# Patient Record
Sex: MALE | Race: WHITE | NOT HISPANIC OR LATINO | Employment: OTHER | URBAN - METROPOLITAN AREA
[De-identification: names, ages, dates, MRNs, and addresses within clinical notes are randomized per-mention and may not be internally consistent; named-entity substitution may affect disease eponyms.]

---

## 2023-11-01 ENCOUNTER — HOSPITAL ENCOUNTER (EMERGENCY)
Facility: MEDICAL CENTER | Age: 68
End: 2023-11-01
Attending: STUDENT IN AN ORGANIZED HEALTH CARE EDUCATION/TRAINING PROGRAM
Payer: MEDICARE

## 2023-11-01 ENCOUNTER — APPOINTMENT (OUTPATIENT)
Dept: RADIOLOGY | Facility: MEDICAL CENTER | Age: 68
End: 2023-11-01
Attending: STUDENT IN AN ORGANIZED HEALTH CARE EDUCATION/TRAINING PROGRAM
Payer: MEDICARE

## 2023-11-01 VITALS
BODY MASS INDEX: 28.69 KG/M2 | SYSTOLIC BLOOD PRESSURE: 141 MMHG | RESPIRATION RATE: 16 BRPM | DIASTOLIC BLOOD PRESSURE: 81 MMHG | HEART RATE: 89 BPM | OXYGEN SATURATION: 92 % | TEMPERATURE: 98.8 F | WEIGHT: 200.4 LBS | HEIGHT: 70 IN

## 2023-11-01 DIAGNOSIS — R55 SYNCOPE, UNSPECIFIED SYNCOPE TYPE: ICD-10-CM

## 2023-11-01 DIAGNOSIS — F10.920 ACUTE ALCOHOLIC INTOXICATION WITHOUT COMPLICATION (HCC): ICD-10-CM

## 2023-11-01 DIAGNOSIS — S02.2XXA CLOSED FRACTURE OF NASAL BONE, INITIAL ENCOUNTER: ICD-10-CM

## 2023-11-01 DIAGNOSIS — S01.81XA FACIAL LACERATION, INITIAL ENCOUNTER: ICD-10-CM

## 2023-11-01 DIAGNOSIS — T07.XXXA ABRASIONS OF MULTIPLE SITES: ICD-10-CM

## 2023-11-01 LAB
ALBUMIN SERPL BCP-MCNC: 4.8 G/DL (ref 3.2–4.9)
ALBUMIN/GLOB SERPL: 1.5 G/DL
ALP SERPL-CCNC: 64 U/L (ref 30–99)
ALT SERPL-CCNC: 33 U/L (ref 2–50)
ANION GAP SERPL CALC-SCNC: 14 MMOL/L (ref 7–16)
AST SERPL-CCNC: 34 U/L (ref 12–45)
BASOPHILS # BLD AUTO: 0.8 % (ref 0–1.8)
BASOPHILS # BLD: 0.13 K/UL (ref 0–0.12)
BILIRUB SERPL-MCNC: 0.2 MG/DL (ref 0.1–1.5)
BUN SERPL-MCNC: 9 MG/DL (ref 8–22)
CALCIUM ALBUM COR SERPL-MCNC: 8.4 MG/DL (ref 8.5–10.5)
CALCIUM SERPL-MCNC: 9 MG/DL (ref 8.5–10.5)
CHLORIDE SERPL-SCNC: 100 MMOL/L (ref 96–112)
CO2 SERPL-SCNC: 23 MMOL/L (ref 20–33)
CREAT SERPL-MCNC: 0.81 MG/DL (ref 0.5–1.4)
EKG IMPRESSION: NORMAL
EOSINOPHIL # BLD AUTO: 0.67 K/UL (ref 0–0.51)
EOSINOPHIL NFR BLD: 4 % (ref 0–6.9)
ERYTHROCYTE [DISTWIDTH] IN BLOOD BY AUTOMATED COUNT: 44.4 FL (ref 35.9–50)
ETHANOL BLD-MCNC: 219 MG/DL
GFR SERPLBLD CREATININE-BSD FMLA CKD-EPI: 96 ML/MIN/1.73 M 2
GLOBULIN SER CALC-MCNC: 3.2 G/DL (ref 1.9–3.5)
GLUCOSE SERPL-MCNC: 119 MG/DL (ref 65–99)
HCT VFR BLD AUTO: 52.1 % (ref 42–52)
HGB BLD-MCNC: 17.5 G/DL (ref 14–18)
IMM GRANULOCYTES # BLD AUTO: 0.16 K/UL (ref 0–0.11)
IMM GRANULOCYTES NFR BLD AUTO: 1 % (ref 0–0.9)
LYMPHOCYTES # BLD AUTO: 1.69 K/UL (ref 1–4.8)
LYMPHOCYTES NFR BLD: 10.2 % (ref 22–41)
MCH RBC QN AUTO: 31.3 PG (ref 27–33)
MCHC RBC AUTO-ENTMCNC: 33.6 G/DL (ref 32.3–36.5)
MCV RBC AUTO: 93.2 FL (ref 81.4–97.8)
MONOCYTES # BLD AUTO: 1.08 K/UL (ref 0–0.85)
MONOCYTES NFR BLD AUTO: 6.5 % (ref 0–13.4)
NEUTROPHILS # BLD AUTO: 12.91 K/UL (ref 1.82–7.42)
NEUTROPHILS NFR BLD: 77.5 % (ref 44–72)
NRBC # BLD AUTO: 0 K/UL
NRBC BLD-RTO: 0 /100 WBC (ref 0–0.2)
PLATELET # BLD AUTO: 216 K/UL (ref 164–446)
PMV BLD AUTO: 10.5 FL (ref 9–12.9)
POTASSIUM SERPL-SCNC: 4.5 MMOL/L (ref 3.6–5.5)
PROT SERPL-MCNC: 8 G/DL (ref 6–8.2)
RBC # BLD AUTO: 5.59 M/UL (ref 4.7–6.1)
SODIUM SERPL-SCNC: 137 MMOL/L (ref 135–145)
TROPONIN T SERPL-MCNC: 6 NG/L (ref 6–19)
WBC # BLD AUTO: 16.6 K/UL (ref 4.8–10.8)

## 2023-11-01 PROCEDURE — 304217 HCHG IRRIGATION SYSTEM

## 2023-11-01 PROCEDURE — 99284 EMERGENCY DEPT VISIT MOD MDM: CPT

## 2023-11-01 PROCEDURE — 12014 RPR F/E/E/N/L/M 5.1-7.5 CM: CPT

## 2023-11-01 PROCEDURE — 82077 ASSAY SPEC XCP UR&BREATH IA: CPT

## 2023-11-01 PROCEDURE — 71045 X-RAY EXAM CHEST 1 VIEW: CPT

## 2023-11-01 PROCEDURE — 80053 COMPREHEN METABOLIC PANEL: CPT

## 2023-11-01 PROCEDURE — 93005 ELECTROCARDIOGRAM TRACING: CPT | Performed by: STUDENT IN AN ORGANIZED HEALTH CARE EDUCATION/TRAINING PROGRAM

## 2023-11-01 PROCEDURE — 303747 HCHG EXTRA SUTURE

## 2023-11-01 PROCEDURE — 84484 ASSAY OF TROPONIN QUANT: CPT

## 2023-11-01 PROCEDURE — 85025 COMPLETE CBC W/AUTO DIFF WBC: CPT

## 2023-11-01 PROCEDURE — 70450 CT HEAD/BRAIN W/O DYE: CPT

## 2023-11-01 PROCEDURE — 70486 CT MAXILLOFACIAL W/O DYE: CPT

## 2023-11-01 PROCEDURE — 700101 HCHG RX REV CODE 250: Performed by: STUDENT IN AN ORGANIZED HEALTH CARE EDUCATION/TRAINING PROGRAM

## 2023-11-01 PROCEDURE — 72125 CT NECK SPINE W/O DYE: CPT

## 2023-11-01 PROCEDURE — 304999 HCHG REPAIR-SIMPLE/INTERMED LEVEL 1

## 2023-11-01 PROCEDURE — 36415 COLL VENOUS BLD VENIPUNCTURE: CPT

## 2023-11-01 RX ORDER — LIDOCAINE HYDROCHLORIDE AND EPINEPHRINE 10; 10 MG/ML; UG/ML
20 INJECTION, SOLUTION INFILTRATION; PERINEURAL ONCE
Status: COMPLETED | OUTPATIENT
Start: 2023-11-01 | End: 2023-11-01

## 2023-11-01 RX ADMIN — LIDOCAINE HYDROCHLORIDE,EPINEPHRINE BITARTRATE 20 ML: 10; .01 INJECTION, SOLUTION INFILTRATION; PERINEURAL at 03:00

## 2023-11-01 NOTE — ED PROVIDER NOTES
CHIEF COMPLAINT  Chief Complaint   Patient presents with    T-5000    GLF     Pt fell while taking out the trash; fall was unwitnessed.  Pt endorses loss of consciousness for unknown length of time.  +ETOH  Swelling right forehead; laceration to bridge of nose; possible laceration to upper lip at base of nose.  Pt denies any anticoagulation.       LIMITATION TO HISTORY   Select: Acute alcohol intoxication    HPI    Alberto Bowles is a 68 y.o. male who presents to the Emergency Department presents for evaluation of fall versus a syncopal episode.  Patient was taking out the trash and the next thing he knew he woke up on the ground, does admit to drinking alcohol this evening there was a positive head strike with a loss of consciousness denies any blood thinners or anticoagulation.  He is complaining of pain around the laceration on his nose lip, as well as mild cervical pain    OUTSIDE HISTORIAN(S):  Select: Daughter at bedside reports that the patient does have a history of esophageal cancer    EXTERNAL RECORDS REVIEWED  Select: Other there are no pertinent records for review      PAST MEDICAL HISTORY  No past medical history on file.  .    SURGICAL HISTORY  No past surgical history on file.      FAMILY HISTORY  No family history on file.       SOCIAL HISTORY  Social History     Socioeconomic History    Marital status:      Spouse name: Not on file    Number of children: Not on file    Years of education: Not on file    Highest education level: Not on file   Occupational History    Not on file   Tobacco Use    Smoking status: Not on file    Smokeless tobacco: Not on file   Substance and Sexual Activity    Alcohol use: Not on file    Drug use: Not on file    Sexual activity: Not on file   Other Topics Concern    Not on file   Social History Narrative    Not on file     Social Determinants of Health     Financial Resource Strain: Not on file   Food Insecurity: Not on file   Transportation Needs: Not on  "file   Physical Activity: Not on file   Stress: Not on file   Social Connections: Not on file   Intimate Partner Violence: Not on file   Housing Stability: Not on file         CURRENT MEDICATIONS  No current facility-administered medications on file prior to encounter.     No current outpatient medications on file prior to encounter.           ALLERGIES  No Known Allergies    PHYSICAL EXAM  VITAL SIGNS:BP (!) 141/81   Pulse 89   Temp 37.1 °C (98.8 °F) (Temporal)   Resp 16   Ht 1.778 m (5' 10\")   Wt 90.9 kg (200 lb 6.4 oz)   SpO2 92%   BMI 28.75 kg/m²       VITALS - vital signs documented prior to this note have been reviewed and noted,  GENERAL - awake, alert, oriented, GCS 15, no apparent distress, non-toxic  appearing  HEENT - normocephalic, atraumatic, pupils equal, sclera anicteric, mucus  membranes moist he has a 2 cm laceration of the bridge of his nose with associated swelling there is no septal hematoma, does appear to be a deviated septum, there is abrasion of his forehead, there is a additional 1 cm laceration along his frenulum  NECK - supple, no meningismus, full active range of motion, trachea midline  CARDIOVASCULAR - regular rate/rhythm, no murmurs/gallops/rubs  PULMONARY - no respiratory distress, speaking in full sentences, clear to  auscultation bilaterally, no wheezing/ronchi/rales, no accessory muscle use  GASTROINTESTINAL - soft, non-tender, non-distended, no rebound, guarding,  or peritonitis  GENITOURINARY - Deferred  NEUROLOGIC - Awake alert, normal mental status, speech fluid, cognition  normal, moves all extremities  MUSCULOSKELETAL - no obvious asymmetry or deformities present  EXTREMITIES - warm, well-perfused, no cyanosis or significant edema  DERMATOLOGIC - warm, dry, no rashes, no jaundice  PSYCHIATRIC - normal affect, normal insight, normal concentration    DIAGNOSTIC STUDIES / PROCEDURES  EKG  I have independently interpreted this EKG  Interpreted below by myself as  Report "   Date Value Ref Range Status   2023       [facility] Emergency Dept.    Test Date:  2023  Pt Name:    SOLANGE MCELROY               Department: ER  MRN:        [id number]                      Room:        23  Gender:     Male                         Technician: [id number]  :        1955                   Requested By:ELSA GALEAS  Order #:    [id number]                    Reading MD: Elsa Galeas    Measurements  Intervals                                Axis  Rate:       88                           P:          61  TN:         178                          QRS:        16  QRSD:       97                           T:          35  QT:         367  QTc:        444    Interpretive Statements  Sinus rhythm  Probable left atrial enlargement  Baseline wander in lead(s) V6  No previous ECG available for comparison  Electronically Signed On 2023 05:56:47 PDT by Elsa Galeas            No stemi    LABS  Labs Reviewed   CBC WITH DIFFERENTIAL - Abnormal; Notable for the following components:       Result Value    WBC 16.6 (*)     Hematocrit 52.1 (*)     Neutrophils-Polys 77.50 (*)     Lymphocytes 10.20 (*)     Immature Granulocytes 1.00 (*)     Neutrophils (Absolute) 12.91 (*)     Monos (Absolute) 1.08 (*)     Eos (Absolute) 0.67 (*)     Baso (Absolute) 0.13 (*)     Immature Granulocytes (abs) 0.16 (*)     All other components within normal limits    Narrative:     Biotin intake of greater than 5 mg per day may interfere with                  troponin levels, causing false low values.   COMP METABOLIC PANEL - Abnormal; Notable for the following components:    Glucose 119 (*)     Correct Calcium 8.4 (*)     All other components within normal limits    Narrative:     Biotin intake of greater than 5 mg per day may interfere with                  troponin levels, causing false low values.   DIAGNOSTIC ALCOHOL - Abnormal; Notable for the following components:    Diagnostic  Alcohol 219.0 (*)     All other components within normal limits    Narrative:     Biotin intake of greater than 5 mg per day may interfere with                  troponin levels, causing false low values.   TROPONIN    Narrative:     Biotin intake of greater than 5 mg per day may interfere with                  troponin levels, causing false low values.   ESTIMATED GFR    Narrative:     Biotin intake of greater than 5 mg per day may interfere with                  troponin levels, causing false low values.       There is a leukocytosis, no infectious source doubt sepsis, alcohol is elevated  RADIOLOGY  I have independently interpreted the diagnostic imaging associated with this visit and am waiting the final reading from the radiologist.   My preliminary interpretation is as follows: CT head was negative for bleed       Radiologist interpretation:   CT-MAXILLOFACIAL W/O PLUS RECONS   Final Result         1.  Comminuted bilateral nasal bone fractures   2.  Anterior nasal septal fracture with apex right nasal septal deviation   3.  Atherosclerosis      CT-CSPINE WITHOUT PLUS RECONS   Final Result         1.  Multilevel degenerative changes of the cervical spine limit diagnostic sensitivity of this examination, otherwise no acute traumatic bony injury of the cervical spine is apparent.   2.  Atherosclerosis      CT-HEAD W/O   Final Result         1.  No acute intracranial abnormality is identified, there are nonspecific white matter changes, commonly associated with small vessel ischemic disease.  Associated mild cerebral atrophy is noted.   2.  Comminuted bilateral nasal bone fractures   3.  [place] right nasal septal deviation and fracture.   4.  Atherosclerosis.         DX-CHEST-PORTABLE (1 VIEW)   Final Result         1.  No acute cardiopulmonary disease.           COURSE & MEDICAL DECISION MAKING    ED COURSE:    ED Observation Status? Yes;I am placing the patient in to an observation status due to a diagnostic  uncertainty as well as therapeutic intensity. Patient placed in observation status at 2:41 AM 11/1/2023    Observation plan is as follows:     INTERVENTIONS BY ME:  Medications   tetanus-dipth-acell pertussis (Adacel) inj 0.5 mL (has no administration in time range)   lidocaine-epinephrine 1 %-1:610739 1 %-1:468188 injection 20 mL (20 mL Injection Given by Provider 11/1/23 0300)       Response on recheck:.      Patient discharged from ED observation at 6:01 AM 11/01/23      PERFORMED BY - Geoffrey Galeas DO  PROCEDURE - Laceration repair    PROCEDURE IN DETAIL - The skin was prepped and draped. 5 mL of 1%  lidocaine with/without epinephrine was used in local infiltration with good  anesthetic result. The wound was copiously irrigated with normal saline under  pressure. The wound was inspected for foreign body and for injury to deep  structures. No foreign body and no additional injuries were noted.     The wound was  closed with 3 separate lacerations were repaired total wound length repaired was 7 cm.  4 sutures on the bridge of the nose 3 on the tip, and 3 on the lip with good hemostasis and good approximation.  COMPLICATIONS - There were no complications with the procedure..      @HTN/IDDM FOLLOW UP:  The patient has known hypertension and is being followed by their primary care doctor@    INITIAL ASSESSMENT, COURSE AND PLAN  Care Narrative: Patient presented for evaluation of a closed head injury, possible syncopal episode.  He did amount to drinking alcohol this evening, and was taking the trash out it was a lasting he remembers.  On my assessment patient is awake alert he is answering questions appropriately he does have abrasions and facial lacerations, given the acute alcohol intoxication, the fall, and possible syncopal episode labs a CT head CT cervical spine were obtained.  CTs were remarkable for a comminuted bilateral nasal bone fractures and a septal fracture, though otherwise negative for acute  pathology. Reccommended out patient follow up for these fracture. Labs did reveal a leukocytosis he has no  specific infectious complaints I do not believe this to be infection in etiology believe is a stress demargination from the patient's recent trauma.  Alcohol was significantly elevated at 219.  He was observed in the emergency department for 4 hours and prior to discharge had achieved clinical sobriety he was awake alert answering questions appropriately ambulating with statement with no further complaints.  I believe the patient's elevated alcohol may have contributed to his fall and syncope this evening as his work-up is otherwise unremarkable.  All pertinent return precautions were discussed with the patient, and they expressed understanding.  Patient was discharged in a stable condition               ADDITIONAL PROBLEM LIST  Laceration  DISPOSITION AND DISCUSSIONS    Escalation of care considered, and ultimately not performed:acute inpatient care management, however at this time, the patient is most appropriate for outpatient management    Barriers to care at this time, including but not limited to: Patient does not have established PCP.     Decision tools and prescription drugs considered including, but not limited to: Antibiotics   .    FINAL DIAGNOSIS  1. Abrasions of multiple sites    2. Facial laceration, initial encounter    3. Syncope, unspecified syncope type    4. Closed fracture of nasal bone, initial encounter    5. Acute alcoholic intoxication without complication (HCC)             Electronically signed by: Geoffrey Galeas DO ,6:01 AM 11/01/23

## 2023-11-01 NOTE — ED TRIAGE NOTES
Chief Complaint   Patient presents with    T-5000    GLF     Pt fell while taking out the trash; fall was unwitnessed.  Pt endorses loss of consciousness for unknown length of time.  +ETOH  Swelling right forehead; laceration to bridge of nose; possible laceration to upper lip at base of nose.  Pt denies any anticoagulation.     Pt is amnestic to events.

## 2023-11-01 NOTE — DISCHARGE INSTRUCTIONS
He had 10 sutures placed for near upper nose 3 the tip of your nose and 3 in your lip return in 5 to 7 days to have the sutures removed keep them clean and dry you may clean with gentle soap and water as needed if you develop any redness purulent drainage return to the ER right away.  There was a noted nasal bone fractures follow-up with an ear nose and throat physician, return with any other new or concerning symptoms

## 2024-01-08 SDOH — ECONOMIC STABILITY: HOUSING INSECURITY
IN THE LAST 12 MONTHS, WAS THERE A TIME WHEN YOU DID NOT HAVE A STEADY PLACE TO SLEEP OR SLEPT IN A SHELTER (INCLUDING NOW)?: NO

## 2024-01-08 SDOH — ECONOMIC STABILITY: INCOME INSECURITY: HOW HARD IS IT FOR YOU TO PAY FOR THE VERY BASICS LIKE FOOD, HOUSING, MEDICAL CARE, AND HEATING?: NOT HARD AT ALL

## 2024-01-08 SDOH — ECONOMIC STABILITY: FOOD INSECURITY: WITHIN THE PAST 12 MONTHS, YOU WORRIED THAT YOUR FOOD WOULD RUN OUT BEFORE YOU GOT MONEY TO BUY MORE.: NEVER TRUE

## 2024-01-08 SDOH — HEALTH STABILITY: PHYSICAL HEALTH: ON AVERAGE, HOW MANY DAYS PER WEEK DO YOU ENGAGE IN MODERATE TO STRENUOUS EXERCISE (LIKE A BRISK WALK)?: 0 DAYS

## 2024-01-08 SDOH — ECONOMIC STABILITY: TRANSPORTATION INSECURITY
IN THE PAST 12 MONTHS, HAS THE LACK OF TRANSPORTATION KEPT YOU FROM MEDICAL APPOINTMENTS OR FROM GETTING MEDICATIONS?: NO

## 2024-01-08 SDOH — ECONOMIC STABILITY: FOOD INSECURITY: WITHIN THE PAST 12 MONTHS, THE FOOD YOU BOUGHT JUST DIDN'T LAST AND YOU DIDN'T HAVE MONEY TO GET MORE.: NEVER TRUE

## 2024-01-08 SDOH — ECONOMIC STABILITY: INCOME INSECURITY: IN THE LAST 12 MONTHS, WAS THERE A TIME WHEN YOU WERE NOT ABLE TO PAY THE MORTGAGE OR RENT ON TIME?: NO

## 2024-01-08 SDOH — ECONOMIC STABILITY: TRANSPORTATION INSECURITY
IN THE PAST 12 MONTHS, HAS LACK OF TRANSPORTATION KEPT YOU FROM MEETINGS, WORK, OR FROM GETTING THINGS NEEDED FOR DAILY LIVING?: NO

## 2024-01-08 SDOH — ECONOMIC STABILITY: TRANSPORTATION INSECURITY
IN THE PAST 12 MONTHS, HAS LACK OF RELIABLE TRANSPORTATION KEPT YOU FROM MEDICAL APPOINTMENTS, MEETINGS, WORK OR FROM GETTING THINGS NEEDED FOR DAILY LIVING?: NO

## 2024-01-08 SDOH — HEALTH STABILITY: PHYSICAL HEALTH: ON AVERAGE, HOW MANY MINUTES DO YOU ENGAGE IN EXERCISE AT THIS LEVEL?: 0 MIN

## 2024-01-08 SDOH — ECONOMIC STABILITY: HOUSING INSECURITY

## 2024-01-08 SDOH — HEALTH STABILITY: MENTAL HEALTH
STRESS IS WHEN SOMEONE FEELS TENSE, NERVOUS, ANXIOUS, OR CAN'T SLEEP AT NIGHT BECAUSE THEIR MIND IS TROUBLED. HOW STRESSED ARE YOU?: NOT AT ALL

## 2024-01-08 ASSESSMENT — LIFESTYLE VARIABLES
HOW MANY STANDARD DRINKS CONTAINING ALCOHOL DO YOU HAVE ON A TYPICAL DAY: PATIENT DECLINED
AUDIT-C TOTAL SCORE: -1
HOW OFTEN DO YOU HAVE A DRINK CONTAINING ALCOHOL: 4 OR MORE TIMES A WEEK
SKIP TO QUESTIONS 9-10: 0
HOW OFTEN DO YOU HAVE SIX OR MORE DRINKS ON ONE OCCASION: PATIENT DECLINED

## 2024-01-08 ASSESSMENT — SOCIAL DETERMINANTS OF HEALTH (SDOH)
HOW OFTEN DO YOU HAVE SIX OR MORE DRINKS ON ONE OCCASION: PATIENT DECLINED
HOW OFTEN DO YOU GET TOGETHER WITH FRIENDS OR RELATIVES?: PATIENT DECLINED
HOW OFTEN DO YOU ATTEND CHURCH OR RELIGIOUS SERVICES?: NEVER
HOW MANY DRINKS CONTAINING ALCOHOL DO YOU HAVE ON A TYPICAL DAY WHEN YOU ARE DRINKING: PATIENT DECLINED
IN A TYPICAL WEEK, HOW MANY TIMES DO YOU TALK ON THE PHONE WITH FAMILY, FRIENDS, OR NEIGHBORS?: MORE THAN THREE TIMES A WEEK
DO YOU BELONG TO ANY CLUBS OR ORGANIZATIONS SUCH AS CHURCH GROUPS UNIONS, FRATERNAL OR ATHLETIC GROUPS, OR SCHOOL GROUPS?: NO
HOW OFTEN DO YOU ATTEND CHURCH OR RELIGIOUS SERVICES?: NEVER
HOW OFTEN DO YOU ATTENT MEETINGS OF THE CLUB OR ORGANIZATION YOU BELONG TO?: NEVER
HOW OFTEN DO YOU GET TOGETHER WITH FRIENDS OR RELATIVES?: PATIENT DECLINED
HOW OFTEN DO YOU ATTENT MEETINGS OF THE CLUB OR ORGANIZATION YOU BELONG TO?: NEVER
IN A TYPICAL WEEK, HOW MANY TIMES DO YOU TALK ON THE PHONE WITH FAMILY, FRIENDS, OR NEIGHBORS?: MORE THAN THREE TIMES A WEEK
DO YOU BELONG TO ANY CLUBS OR ORGANIZATIONS SUCH AS CHURCH GROUPS UNIONS, FRATERNAL OR ATHLETIC GROUPS, OR SCHOOL GROUPS?: NO
HOW OFTEN DO YOU HAVE A DRINK CONTAINING ALCOHOL: 4 OR MORE TIMES A WEEK
HOW HARD IS IT FOR YOU TO PAY FOR THE VERY BASICS LIKE FOOD, HOUSING, MEDICAL CARE, AND HEATING?: NOT HARD AT ALL
WITHIN THE PAST 12 MONTHS, YOU WORRIED THAT YOUR FOOD WOULD RUN OUT BEFORE YOU GOT THE MONEY TO BUY MORE: NEVER TRUE

## 2024-01-11 ENCOUNTER — OFFICE VISIT (OUTPATIENT)
Dept: MEDICAL GROUP | Facility: PHYSICIAN GROUP | Age: 69
End: 2024-01-11
Payer: MEDICARE

## 2024-01-11 ENCOUNTER — HOSPITAL ENCOUNTER (OUTPATIENT)
Dept: LAB | Facility: MEDICAL CENTER | Age: 69
End: 2024-01-11
Attending: FAMILY MEDICINE
Payer: MEDICARE

## 2024-01-11 ENCOUNTER — APPOINTMENT (OUTPATIENT)
Dept: RADIOLOGY | Facility: IMAGING CENTER | Age: 69
End: 2024-01-11
Attending: FAMILY MEDICINE
Payer: MEDICARE

## 2024-01-11 VITALS
RESPIRATION RATE: 18 BRPM | BODY MASS INDEX: 28.92 KG/M2 | DIASTOLIC BLOOD PRESSURE: 80 MMHG | WEIGHT: 202 LBS | HEIGHT: 70 IN | SYSTOLIC BLOOD PRESSURE: 138 MMHG | TEMPERATURE: 97.9 F | OXYGEN SATURATION: 93 % | HEART RATE: 78 BPM

## 2024-01-11 DIAGNOSIS — T46.6X5A MYALGIA DUE TO STATIN: ICD-10-CM

## 2024-01-11 DIAGNOSIS — D72.829 LEUKOCYTOSIS, UNSPECIFIED TYPE: ICD-10-CM

## 2024-01-11 DIAGNOSIS — M54.41 CHRONIC BILATERAL LOW BACK PAIN WITH RIGHT-SIDED SCIATICA: ICD-10-CM

## 2024-01-11 DIAGNOSIS — G89.29 CHRONIC BILATERAL LOW BACK PAIN WITH RIGHT-SIDED SCIATICA: ICD-10-CM

## 2024-01-11 DIAGNOSIS — S02.2XXS CLOSED FRACTURE OF NASAL BONE, SEQUELA: ICD-10-CM

## 2024-01-11 DIAGNOSIS — F10.90 ALCOHOL USE DISORDER: ICD-10-CM

## 2024-01-11 DIAGNOSIS — L98.9 SKIN LESION: ICD-10-CM

## 2024-01-11 DIAGNOSIS — E66.3 OVERWEIGHT (BMI 25.0-29.9): ICD-10-CM

## 2024-01-11 DIAGNOSIS — R53.82 CHRONIC FATIGUE: ICD-10-CM

## 2024-01-11 DIAGNOSIS — M25.562 CHRONIC PAIN OF LEFT KNEE: ICD-10-CM

## 2024-01-11 DIAGNOSIS — R03.0 ELEVATED BLOOD PRESSURE READING IN OFFICE WITHOUT DIAGNOSIS OF HYPERTENSION: ICD-10-CM

## 2024-01-11 DIAGNOSIS — R73.9 HYPERGLYCEMIA: ICD-10-CM

## 2024-01-11 DIAGNOSIS — H53.9 VISUAL CHANGES: ICD-10-CM

## 2024-01-11 DIAGNOSIS — M79.10 MYALGIA DUE TO STATIN: ICD-10-CM

## 2024-01-11 DIAGNOSIS — Z78.9 STATIN INTOLERANCE: ICD-10-CM

## 2024-01-11 DIAGNOSIS — E78.00 HYPERCHOLESTEROLEMIA: ICD-10-CM

## 2024-01-11 DIAGNOSIS — G89.29 CHRONIC PAIN OF LEFT KNEE: ICD-10-CM

## 2024-01-11 DIAGNOSIS — F17.210 CIGARETTE NICOTINE DEPENDENCE WITHOUT COMPLICATION: ICD-10-CM

## 2024-01-11 DIAGNOSIS — Z12.11 COLON CANCER SCREENING: ICD-10-CM

## 2024-01-11 DIAGNOSIS — L98.9 SKIN LESION OF FOOT: ICD-10-CM

## 2024-01-11 PROBLEM — S02.2XXA CLOSED FRACTURE OF NASAL BONES: Status: ACTIVE | Noted: 2024-01-11

## 2024-01-11 LAB
ALBUMIN SERPL BCP-MCNC: 4.6 G/DL (ref 3.2–4.9)
ALBUMIN/GLOB SERPL: 1.7 G/DL
ALP SERPL-CCNC: 59 U/L (ref 30–99)
ALT SERPL-CCNC: 28 U/L (ref 2–50)
ANION GAP SERPL CALC-SCNC: 13 MMOL/L (ref 7–16)
AST SERPL-CCNC: 20 U/L (ref 12–45)
BASOPHILS # BLD AUTO: 0.6 % (ref 0–1.8)
BASOPHILS # BLD: 0.07 K/UL (ref 0–0.12)
BILIRUB SERPL-MCNC: 0.4 MG/DL (ref 0.1–1.5)
BUN SERPL-MCNC: 12 MG/DL (ref 8–22)
CALCIUM ALBUM COR SERPL-MCNC: 8.6 MG/DL (ref 8.5–10.5)
CALCIUM SERPL-MCNC: 9.1 MG/DL (ref 8.5–10.5)
CHLORIDE SERPL-SCNC: 102 MMOL/L (ref 96–112)
CHOLEST SERPL-MCNC: 268 MG/DL (ref 100–199)
CO2 SERPL-SCNC: 25 MMOL/L (ref 20–33)
CREAT SERPL-MCNC: 0.85 MG/DL (ref 0.5–1.4)
EOSINOPHIL # BLD AUTO: 1.24 K/UL (ref 0–0.51)
EOSINOPHIL NFR BLD: 11.5 % (ref 0–6.9)
ERYTHROCYTE [DISTWIDTH] IN BLOOD BY AUTOMATED COUNT: 44.6 FL (ref 35.9–50)
EST. AVERAGE GLUCOSE BLD GHB EST-MCNC: 134 MG/DL
FASTING STATUS PATIENT QL REPORTED: NORMAL
FOLATE SERPL-MCNC: 19.4 NG/ML
GFR SERPLBLD CREATININE-BSD FMLA CKD-EPI: 95 ML/MIN/1.73 M 2
GLOBULIN SER CALC-MCNC: 2.7 G/DL (ref 1.9–3.5)
GLUCOSE SERPL-MCNC: 107 MG/DL (ref 65–99)
HBA1C MFR BLD: 6.3 % (ref 4–5.6)
HCT VFR BLD AUTO: 50.1 % (ref 42–52)
HDLC SERPL-MCNC: 53 MG/DL
HGB BLD-MCNC: 17.1 G/DL (ref 14–18)
IMM GRANULOCYTES # BLD AUTO: 0.08 K/UL (ref 0–0.11)
IMM GRANULOCYTES NFR BLD AUTO: 0.7 % (ref 0–0.9)
LDLC SERPL CALC-MCNC: 183 MG/DL
LYMPHOCYTES # BLD AUTO: 1.78 K/UL (ref 1–4.8)
LYMPHOCYTES NFR BLD: 16.5 % (ref 22–41)
MCH RBC QN AUTO: 31.3 PG (ref 27–33)
MCHC RBC AUTO-ENTMCNC: 34.1 G/DL (ref 32.3–36.5)
MCV RBC AUTO: 91.8 FL (ref 81.4–97.8)
MONOCYTES # BLD AUTO: 1.18 K/UL (ref 0–0.85)
MONOCYTES NFR BLD AUTO: 10.9 % (ref 0–13.4)
NEUTROPHILS # BLD AUTO: 6.47 K/UL (ref 1.82–7.42)
NEUTROPHILS NFR BLD: 59.8 % (ref 44–72)
NRBC # BLD AUTO: 0 K/UL
NRBC BLD-RTO: 0 /100 WBC (ref 0–0.2)
PLATELET # BLD AUTO: 212 K/UL (ref 164–446)
PMV BLD AUTO: 11.2 FL (ref 9–12.9)
POTASSIUM SERPL-SCNC: 4.1 MMOL/L (ref 3.6–5.5)
PROT SERPL-MCNC: 7.3 G/DL (ref 6–8.2)
RBC # BLD AUTO: 5.46 M/UL (ref 4.7–6.1)
SODIUM SERPL-SCNC: 140 MMOL/L (ref 135–145)
T4 FREE SERPL-MCNC: 1.1 NG/DL (ref 0.93–1.7)
TRIGL SERPL-MCNC: 160 MG/DL (ref 0–149)
TSH SERPL DL<=0.005 MIU/L-ACNC: 6.78 UIU/ML (ref 0.38–5.33)
VIT B12 SERPL-MCNC: 358 PG/ML (ref 211–911)
WBC # BLD AUTO: 10.8 K/UL (ref 4.8–10.8)

## 2024-01-11 PROCEDURE — 83036 HEMOGLOBIN GLYCOSYLATED A1C: CPT

## 2024-01-11 PROCEDURE — 82172 ASSAY OF APOLIPOPROTEIN: CPT

## 2024-01-11 PROCEDURE — 85025 COMPLETE CBC W/AUTO DIFF WBC: CPT

## 2024-01-11 PROCEDURE — 36415 COLL VENOUS BLD VENIPUNCTURE: CPT

## 2024-01-11 PROCEDURE — 99204 OFFICE O/P NEW MOD 45 MIN: CPT | Performed by: FAMILY MEDICINE

## 2024-01-11 PROCEDURE — 73564 X-RAY EXAM KNEE 4 OR MORE: CPT | Mod: TC,LT | Performed by: FAMILY MEDICINE

## 2024-01-11 PROCEDURE — 84439 ASSAY OF FREE THYROXINE: CPT

## 2024-01-11 PROCEDURE — 3079F DIAST BP 80-89 MM HG: CPT | Performed by: FAMILY MEDICINE

## 2024-01-11 PROCEDURE — 84443 ASSAY THYROID STIM HORMONE: CPT

## 2024-01-11 PROCEDURE — 82746 ASSAY OF FOLIC ACID SERUM: CPT

## 2024-01-11 PROCEDURE — 80061 LIPID PANEL: CPT

## 2024-01-11 PROCEDURE — 82607 VITAMIN B-12: CPT

## 2024-01-11 PROCEDURE — 3075F SYST BP GE 130 - 139MM HG: CPT | Performed by: FAMILY MEDICINE

## 2024-01-11 PROCEDURE — 80053 COMPREHEN METABOLIC PANEL: CPT

## 2024-01-11 ASSESSMENT — PATIENT HEALTH QUESTIONNAIRE - PHQ9: CLINICAL INTERPRETATION OF PHQ2 SCORE: 0

## 2024-01-11 ASSESSMENT — FIBROSIS 4 INDEX: FIB4 SCORE: 1.84

## 2024-01-11 NOTE — PROGRESS NOTES
"CHIEF COMPLAINT / REASON FOR VISIT  Alberto Bowles is a 68 y.o. male that presents today to establish care.  Chief Complaint   Patient presents with    Establish Care    Knee Pain    Back Pain    Referral Needed     Ent, for broken nose , deviated septum      HISTORY OF PRESENT ILLNESS  Hasn't had primary doctor for 5 years  Is retired    Fell and broke nose Nov 1, 2023. Wants ENT referral.    2-3 weeks ago, noticed skin lesion right upper abdomen. He thought it was an abscess and tried to pop it but nothing came out.    Reports chronic fatigue. Endorses only occasional snoring.     Chronic back pain, previously diagnosed with cervical and lumbar spine degenerative arthritis, as well as lumbar degenerative disc disease. Aggravated by lifting and standing for too long. He has had epidural steroid injections in the past which were not effective.     Knee pain, left, chronic, aggravated by activity. He reports that he can't walk due to the knee pain.    Takes aleve for pain. Has rash with ibuprofen. Was previously on hydrocodone.     Current cigarette smoker, 0.5-0.75 packs per day. Smoked for past 50 years.     Beers 6-8 per day on average    Hypercholesterolemia - Did not tolerate \"at least 4 different types\" of statins, including atorvastatin, due to muscle aches and brain fog.     Can't drive at night due to blurring of vision when there is a bright light    Chest pain \"for 15-20 years\". He reports that several years ago he had a negative stress test and TTE    Hypothyroidism, briefly on medication but reportedly his TSH normalized off of treatment    Hiatal hernia    Past Surgical History  Appendectomy  Tonsillectomy  Laryngeal cancer s/p resection and radiation in 2009    Social History     Tobacco Use    Smoking status: Every Day     Current packs/day: 0.50     Average packs/day: 0.5 packs/day for 49.7 years (24.8 ttl pk-yrs)     Types: Cigarettes     Start date: 5/1/1974    Smokeless tobacco: Never   Vaping " "Use    Vaping Use: Never used   Substance Use Topics    Alcohol use: Yes     Alcohol/week: 3.6 - 4.8 oz     Types: 6 - 8 Cans of beer per week    Drug use: Never     Family History  Melanoma in 2 sisters    OBJECTIVE    BP (!) 154/82 (BP Location: Right arm, Patient Position: Sitting, BP Cuff Size: Adult)   Pulse 78   Temp 36.6 °C (97.9 °F) (Temporal)   Resp 18   Ht 1.778 m (5' 10\")   Wt 91.6 kg (202 lb)   SpO2 93%   BMI 28.98 kg/m²      PHYSICAL EXAM  Constitutional: Sitting comfortably, in no acute distress, responds to questions appropriately.  Head: Normocephalic  Eyes:  No conjunctival injection, no scleral icterus, PERRL. Pterygium left medial eye  Ears: External ear canals clear, TMs pearly grey with visualized bony landmarks and crisp light reflex  Mouth: Oral mucosa moist  Throat: Oropharynx clear without erythema or tonsillar exudates  Neck: No cervical lymphadenopathy  Heart: Regular S1 S2, no murmurs, rub, or gallops  Lungs: Clear to auscultation bilaterally, no wheezes, rales, or rhonchi  Extremities: No lower extremity edema. 2+ symmetric radial pulses  Skin: Warm and dry, scattered telangiectasias on face   - 1 cm nodular ulcerative erythematous lesion right upper quadrant abdomen   - 2 cm ulcerative erythematous lesion left lateral foot    ASSESSMENT & PLAN  1. Closed fracture of nasal bone, sequela  Nasal fracture November 2023, with subsequent nasal deformity and septal deviation, desires ENT referral to have this fixed  - Referral to ENT    2. Leukocytosis, unspecified type  - CBC WITH DIFFERENTIAL; Future    3. Skin lesion of foot  Left lateral foot skin lesion, suspicious for malignancy, will biopsy at next visit    4. Skin lesion  Right upper abdominal wall skin lesion, suspicious for SCC malignancy.  Will biopsy at next visit    5. Chronic fatigue  Chronic fatigue without localizing symptoms.  Obtain labs  - CBC WITH DIFFERENTIAL; Future  - Comp Metabolic Panel; Future  - TSH+FREE T4  - " VIT B12,  FOLIC ACID  - HEMOGLOBIN A1C; Future    6. Elevated blood pressure reading in office without diagnosis of hypertension  Reports that blood pressures at home are generally better, in the 120s.  Will recheck blood pressure at next visit, consider initiation of antihypertensive at that time if still elevated    7. Cigarette nicotine dependence without complication  Chronic cigarette smoker with approximately 25-pack-year history, currently smoking 0.5 packs/day.  Will discuss cessation at future visit    8. Alcohol use disorder  Drinking 6-8 beers every day.  Advised on max of 2/day recommendation    9. Overweight (BMI 25.0-29.9)  - HEMOGLOBIN A1C; Future    10. Chronic pain of left knee  Chronic, uncontrolled, possibly due to arthritis, obtain knee radiographs prior to next visit  - DX-KNEE COMPLETE 4+ LEFT; Future    11. Chronic bilateral low back pain with right-sided sciatica  Chronic, uncontrolled, patient reports history of lumbar arthritis and degenerative disc disease.  Has received epidural steroid injections in the past without significant benefit.  Will discuss further at future visit    12. Hypercholesterolemia  Chronic, uncontrolled untreated, obtain lipids and apo B prior to next visit.  Did not tolerate 4 different statins including atorvastatin due to myalgias, fatigue, and brain fog.  - Lipid Profile; Future  - APOLIPOPROTEIN B; Future    13. Visual changes  Suspect possible cataract, refer to ophthalmology  - Referral to Ophthalmology    14. Hyperglycemia  - HEMOGLOBIN A1C; Future    15. Colon cancer screening  He reports that he is never done colon cancer screening.  Will discuss this further at next visit.

## 2024-01-13 LAB — APO B100 SERPL-MCNC: 162 MG/DL (ref 66–133)

## 2024-01-23 ENCOUNTER — OFFICE VISIT (OUTPATIENT)
Dept: MEDICAL GROUP | Facility: PHYSICIAN GROUP | Age: 69
End: 2024-01-23
Payer: MEDICARE

## 2024-01-23 ENCOUNTER — HOSPITAL ENCOUNTER (OUTPATIENT)
Facility: MEDICAL CENTER | Age: 69
End: 2024-01-23
Attending: FAMILY MEDICINE
Payer: MEDICARE

## 2024-01-23 VITALS
OXYGEN SATURATION: 97 % | BODY MASS INDEX: 27.49 KG/M2 | WEIGHT: 192 LBS | DIASTOLIC BLOOD PRESSURE: 82 MMHG | TEMPERATURE: 97.6 F | SYSTOLIC BLOOD PRESSURE: 142 MMHG | RESPIRATION RATE: 14 BRPM | HEART RATE: 78 BPM | HEIGHT: 70 IN

## 2024-01-23 DIAGNOSIS — M25.562 CHRONIC PAIN OF LEFT KNEE: ICD-10-CM

## 2024-01-23 DIAGNOSIS — R73.03 PREDIABETES: ICD-10-CM

## 2024-01-23 DIAGNOSIS — I10 PRIMARY HYPERTENSION: ICD-10-CM

## 2024-01-23 DIAGNOSIS — L98.9 SKIN LESION: ICD-10-CM

## 2024-01-23 DIAGNOSIS — L98.9 SKIN LESION OF FOOT: ICD-10-CM

## 2024-01-23 DIAGNOSIS — M79.10 MYALGIA DUE TO STATIN: ICD-10-CM

## 2024-01-23 DIAGNOSIS — G89.29 CHRONIC BILATERAL LOW BACK PAIN WITH RIGHT-SIDED SCIATICA: ICD-10-CM

## 2024-01-23 DIAGNOSIS — D72.10 EOSINOPHILIA, UNSPECIFIED TYPE: ICD-10-CM

## 2024-01-23 DIAGNOSIS — M17.12 PRIMARY OSTEOARTHRITIS OF LEFT KNEE: ICD-10-CM

## 2024-01-23 DIAGNOSIS — Z12.11 COLON CANCER SCREENING: ICD-10-CM

## 2024-01-23 DIAGNOSIS — T46.6X5A MYALGIA DUE TO STATIN: ICD-10-CM

## 2024-01-23 DIAGNOSIS — E78.2 MIXED HYPERLIPIDEMIA: ICD-10-CM

## 2024-01-23 DIAGNOSIS — G89.29 CHRONIC PAIN OF LEFT KNEE: ICD-10-CM

## 2024-01-23 DIAGNOSIS — E03.8 SUBCLINICAL HYPOTHYROIDISM: ICD-10-CM

## 2024-01-23 DIAGNOSIS — M54.41 CHRONIC BILATERAL LOW BACK PAIN WITH RIGHT-SIDED SCIATICA: ICD-10-CM

## 2024-01-23 PROCEDURE — 99214 OFFICE O/P EST MOD 30 MIN: CPT | Mod: 25 | Performed by: FAMILY MEDICINE

## 2024-01-23 PROCEDURE — 88305 TISSUE EXAM BY PATHOLOGIST: CPT

## 2024-01-23 PROCEDURE — 3079F DIAST BP 80-89 MM HG: CPT | Performed by: FAMILY MEDICINE

## 2024-01-23 PROCEDURE — 3077F SYST BP >= 140 MM HG: CPT | Performed by: FAMILY MEDICINE

## 2024-01-23 PROCEDURE — 11102 TANGNTL BX SKIN SINGLE LES: CPT | Performed by: FAMILY MEDICINE

## 2024-01-23 PROCEDURE — 11103 TANGNTL BX SKIN EA SEP/ADDL: CPT | Performed by: FAMILY MEDICINE

## 2024-01-23 PROCEDURE — 20610 DRAIN/INJ JOINT/BURSA W/O US: CPT | Mod: LT | Performed by: FAMILY MEDICINE

## 2024-01-23 RX ORDER — TRIAMCINOLONE ACETONIDE 40 MG/ML
40 INJECTION, SUSPENSION INTRA-ARTICULAR; INTRAMUSCULAR ONCE
Status: COMPLETED | OUTPATIENT
Start: 2024-01-23 | End: 2024-01-23

## 2024-01-23 RX ORDER — OLMESARTAN MEDOXOMIL 20 MG/1
20 TABLET ORAL DAILY
Qty: 100 TABLET | Refills: 3 | Status: SHIPPED | OUTPATIENT
Start: 2024-01-23 | End: 2024-02-01

## 2024-01-23 RX ORDER — OLMESARTAN MEDOXOMIL 20 MG/1
20 TABLET ORAL DAILY
Qty: 90 TABLET | Refills: 3 | Status: SHIPPED | OUTPATIENT
Start: 2024-01-23 | End: 2024-01-23

## 2024-01-23 RX ADMIN — TRIAMCINOLONE ACETONIDE 40 MG: 40 INJECTION, SUSPENSION INTRA-ARTICULAR; INTRAMUSCULAR at 08:26

## 2024-01-23 ASSESSMENT — FIBROSIS 4 INDEX: FIB4 SCORE: 1.19

## 2024-01-23 NOTE — PROGRESS NOTES
"CHIEF COMPLAINT / REASON FOR VISIT  Alberto Bowles is a 68 y.o. male that presents today for   Chief Complaint   Patient presents with    Biopsy     Stomach. L foot     HISTORY OF PRESENT ILLNESS  Reports trying at least 4 different statins, myalgias and brain fog    Reports previously being on levothyroxine but had side effect    Declines all routine cancer screening at this time.    OBJECTIVE     BP (!) 142/82 (BP Location: Right arm, Patient Position: Sitting, BP Cuff Size: Adult)   Pulse 78   Temp 36.4 °C (97.6 °F) (Temporal)   Resp 14   Ht 1.778 m (5' 10\")   Wt 87.1 kg (192 lb)   SpO2 97%   BMI 27.55 kg/m²      PHYSICAL EXAM  Constitutional: Sitting comfortably, in no acute distress, responds to questions appropriately.    ASSESSMENT & PLAN  1. Prediabetes  Counseled regarding importance of low-carbohydrate diet and regular exercise for blood glucose control. Recheck A1c in 4 months  - HEMOGLOBIN A1C; Future  - CBC WITH DIFFERENTIAL; Future    2. Chronic pain of left knee  3. Primary osteoarthritis of left knee  Chronic left knee pain secondary to osteoarthritis, performed steroid injection, see procedure note  - triamcinolone acetonide (Kenalog-40) injection 40 mg  - Joint Inj - LG: knee, L knee    4. Skin lesion of foot  Skin lesion dorsal lateral left foot, suspect SCC versus BCC, performed shave biopsy, will inform patient of results via MyChart  - Pathology Specimen; Future  - Skin Lesion Shave Biopsy    5. Skin lesion  Skin lesion right upper quadrant abdomen, suspect SCC versus BCC, performed shave biopsy, and will inform patient of results via MyChart  - Pathology Specimen; Future  - Skin Lesion Shave Biopsy    6. Primary hypertension  New diagnosis, uncontrolled, initiate olmesartan 20 mg daily, goal blood pressure less than 130/80  - olmesartan (BENICAR) 20 MG Tab; Take 1 Tablet by mouth every day.  Dispense: 100 Tablet; Refill: 3    7. Subclinical hypothyroidism  After discussion " decided to hold off on treatment.  Continue to monitor.  Previously tried levothyroxine in past and reports that he had an adverse effect but does not remember what it was  - TSH+FREE T4    8. Mixed hyperlipidemia  9. Myalgia due to statin  Chronic, uncontrolled with elevated LDL cholesterol of 183 mg/dL and elevated apo B of 162 mg/dL.  Also has hypertriglyceridemia of 160. Had intolerable myalgias with 4 different statin medications.  Will initiate bempedoic acid-ezetimibe 180-10 mg daily.  Goal LDL-C cholesterol less than 100  - Bempedoic Acid-Ezetimibe 180-10 MG Tab; Take 1 Tablet by mouth every day.  Dispense: 100 Tablet; Refill: 3  - Lipid Profile; Future    10. Eosinophilia, unspecified type  - CBC WITH DIFFERENTIAL; Future    11. Colon cancer screening  - PAOLO (FIT DNA)    12. Chronic bilateral low back pain with right-sided sciatica  Chronic bilateral low back pain, previously had right sided radiculopathy symptoms but these have resolved.  His pain is uncontrolled, reports having multiple epidural steroid injections in the past.  Will obtain radiographs followed by MRI and pain management referral  - MR-LUMBAR SPINE-W/O; Future  - Referral to Pain Management  - DX-LUMBAR SPINE-2 OR 3 VIEWS; Future

## 2024-01-23 NOTE — PROCEDURES
Joint Inj - LG: knee, L knee on 1/23/2024 8:09 AM  Indications: pain  Details: 25 G needle, anterolateral approach  Medications: (Triamcinolone acetonide 40 mg - 1 mL  Lidocaine 1% - 4 mL  )  Outcome: tolerated well, no immediate complications  Procedure, treatment alternatives, risks and benefits explained, specific risks discussed. Consent was given by the patient. Immediately prior to procedure a time out was called to verify the correct patient, procedure, equipment, support staff and site/side marked as required. Patient was prepped and draped in the usual sterile fashion.

## 2024-01-23 NOTE — PROCEDURES
Skin Lesion Shave Biopsy    Date/Time: 1/23/2024 8:33 AM    Performed by: Alberto Paulson M.D.  Authorized by: Alberto Paulson M.D.    Procedure Details - Skin Biopsy:     Body area: trunk    Trunk location: abdomen (right upper quadrant)    Initial size (mm): 10    Malignancy: malignancy unknown      Destruction method: tangential shave biopsy    Skin Lesion Shave Biopsy    Date/Time: 1/23/2024 8:34 AM    Performed by: Alberto Paulson M.D.  Authorized by: Alberto Paulson M.D.    Procedure Details - Skin Biopsy:     Body area: lower extremity    Lower extremity location: L foot    Initial size (mm): 20    Malignancy: malignancy unknown      Destruction method: tangential shave biopsy

## 2024-01-24 LAB — PATHOLOGY CONSULT NOTE: NORMAL

## 2024-02-01 ENCOUNTER — OFFICE VISIT (OUTPATIENT)
Dept: MEDICAL GROUP | Facility: PHYSICIAN GROUP | Age: 69
End: 2024-02-01
Payer: MEDICARE

## 2024-02-01 ENCOUNTER — HOSPITAL ENCOUNTER (OUTPATIENT)
Dept: RADIOLOGY | Facility: MEDICAL CENTER | Age: 69
End: 2024-02-01
Attending: FAMILY MEDICINE
Payer: MEDICARE

## 2024-02-01 VITALS
RESPIRATION RATE: 14 BRPM | BODY MASS INDEX: 28.35 KG/M2 | HEART RATE: 74 BPM | HEIGHT: 70 IN | SYSTOLIC BLOOD PRESSURE: 144 MMHG | DIASTOLIC BLOOD PRESSURE: 86 MMHG | WEIGHT: 198 LBS | OXYGEN SATURATION: 95 % | TEMPERATURE: 97.4 F

## 2024-02-01 DIAGNOSIS — M25.561 BILATERAL CHRONIC KNEE PAIN: ICD-10-CM

## 2024-02-01 DIAGNOSIS — M25.562 BILATERAL CHRONIC KNEE PAIN: ICD-10-CM

## 2024-02-01 DIAGNOSIS — C44.92 KERATOACANTHOMA TYPE SQUAMOUS CELL CARCINOMA OF SKIN: ICD-10-CM

## 2024-02-01 DIAGNOSIS — G89.29 BILATERAL CHRONIC KNEE PAIN: ICD-10-CM

## 2024-02-01 DIAGNOSIS — M54.41 CHRONIC BILATERAL LOW BACK PAIN WITH RIGHT-SIDED SCIATICA: ICD-10-CM

## 2024-02-01 DIAGNOSIS — M25.562 CHRONIC PAIN OF LEFT KNEE: ICD-10-CM

## 2024-02-01 DIAGNOSIS — I10 PRIMARY HYPERTENSION: ICD-10-CM

## 2024-02-01 DIAGNOSIS — G89.29 CHRONIC PAIN OF LEFT KNEE: ICD-10-CM

## 2024-02-01 DIAGNOSIS — E78.00 HYPERCHOLESTEROLEMIA: ICD-10-CM

## 2024-02-01 DIAGNOSIS — M17.12 PRIMARY OSTEOARTHRITIS OF LEFT KNEE: ICD-10-CM

## 2024-02-01 DIAGNOSIS — C44.92 SQUAMOUS CELL CARCINOMA OF SKIN: ICD-10-CM

## 2024-02-01 DIAGNOSIS — G89.29 CHRONIC BILATERAL LOW BACK PAIN WITH RIGHT-SIDED SCIATICA: ICD-10-CM

## 2024-02-01 PROCEDURE — 99214 OFFICE O/P EST MOD 30 MIN: CPT | Mod: 25 | Performed by: FAMILY MEDICINE

## 2024-02-01 PROCEDURE — 72100 X-RAY EXAM L-S SPINE 2/3 VWS: CPT

## 2024-02-01 PROCEDURE — 3077F SYST BP >= 140 MM HG: CPT | Performed by: FAMILY MEDICINE

## 2024-02-01 PROCEDURE — 17261 DSTRJ MAL LES T/A/L .6-1.0CM: CPT | Performed by: FAMILY MEDICINE

## 2024-02-01 PROCEDURE — 17272 DSTR MAL LES S/N/H/F/G 1.1-2: CPT | Performed by: FAMILY MEDICINE

## 2024-02-01 PROCEDURE — 3079F DIAST BP 80-89 MM HG: CPT | Performed by: FAMILY MEDICINE

## 2024-02-01 RX ORDER — AMLODIPINE BESYLATE 5 MG/1
5 TABLET ORAL DAILY
Qty: 90 TABLET | Refills: 3 | Status: SHIPPED | OUTPATIENT
Start: 2024-02-01

## 2024-02-01 ASSESSMENT — FIBROSIS 4 INDEX: FIB4 SCORE: 1.19

## 2024-02-01 NOTE — PROGRESS NOTES
"CHIEF COMPLAINT / REASON FOR VISIT  Alberto Bowles is a 68 y.o. male that presents today for   Chief Complaint   Patient presents with    Follow-Up     Knee pain    Medication Problem     Bp      HISTORY OF PRESENT ILLNESS  Reports side effects of headaches and nausea with olmesartan 20 mg daily, so stopped taking.    Reports only temporary benefit from left knee steroid    OBJECTIVE     BP (!) 144/86 (BP Location: Left arm, Patient Position: Sitting, BP Cuff Size: Adult)   Pulse 74   Temp 36.3 °C (97.4 °F) (Temporal)   Resp 14   Ht 1.778 m (5' 10\")   Wt 89.8 kg (198 lb)   SpO2 95%   BMI 28.41 kg/m²      PHYSICAL EXAM  Constitutional: Sitting comfortably, in no acute distress, responds to questions appropriately.  Skin: Warm and dry, no rashes or lesions on face or exposed upper extremities    ASSESSMENT & PLAN  1. Primary osteoarthritis of left knee  2. Chronic pain of left knee  Chronic, uncontrolled, only had very temporary benefit with left knee steroid injection, recommend referral to orthopedics for further evaluation.  - Referral to Orthopedics    3. Primary hypertension  Chronic, uncontrolled. Did not tolerate olmesartan  - amLODIPine (NORVASC) 5 MG Tab; Take 1 Tablet by mouth every day.  Dispense: 90 Tablet; Refill: 3    4. Hypercholesterolemia  Chronic, uncontrolled. Requests 5 tablets to make sure he tolerates it before buying 100 tablets, due to cost of medication  - Bempedoic Acid-Ezetimibe 180-10 MG Tab; Take 1 Tablet by mouth every day.  Dispense: 5 Tablet; Refill: 0    5. Squamous cell carcinoma of skin  Location left dorsolateral foot, s/p shave biopsy with lesion transected at base. Performed cryotherapy and curettage x 2 for definitive treatment.     6. 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 Performed cryotherapy and curettage x 2 for definitive treatment.       "

## 2024-02-01 NOTE — PROCEDURES
Lesionn Destruction - Malignant (squamous cell carcinoma)    Date/Time: 2/1/2024 11:39 AM    Performed by: Alberto Paulson M.D.  Authorized by: Ablerto Paulson M.D.    Number of Lesions: 2  Lesion 1:     Body area: trunk    Trunk location: abdomen (right upper quadrant)    Skin lesion 1 size (mm): 10.    Malignancy: malignant lesion      Destruction method comment: cryotherapy and curettage x 2  Lesion 2:     Body area: lower extremity    Lower extremity location: L foot    Initial size (mm): 20    Malignancy: malignant lesion      Destruction method comment: cryotherapy and curettage x 2

## 2024-02-05 ENCOUNTER — APPOINTMENT (OUTPATIENT)
Dept: PHYSICAL MEDICINE AND REHAB | Facility: MEDICAL CENTER | Age: 69
End: 2024-02-05
Payer: MEDICARE

## 2024-02-06 ENCOUNTER — HOSPITAL ENCOUNTER (OUTPATIENT)
Dept: RADIOLOGY | Facility: MEDICAL CENTER | Age: 69
End: 2024-02-06
Attending: FAMILY MEDICINE
Payer: MEDICARE

## 2024-02-06 DIAGNOSIS — G89.29 CHRONIC BILATERAL LOW BACK PAIN WITH RIGHT-SIDED SCIATICA: ICD-10-CM

## 2024-02-06 DIAGNOSIS — M54.41 CHRONIC BILATERAL LOW BACK PAIN WITH RIGHT-SIDED SCIATICA: ICD-10-CM

## 2024-02-06 PROCEDURE — 72148 MRI LUMBAR SPINE W/O DYE: CPT

## 2024-02-07 ENCOUNTER — OFFICE VISIT (OUTPATIENT)
Dept: PHYSICAL MEDICINE AND REHAB | Facility: MEDICAL CENTER | Age: 69
End: 2024-02-07
Payer: MEDICARE

## 2024-02-07 VITALS
DIASTOLIC BLOOD PRESSURE: 72 MMHG | OXYGEN SATURATION: 95 % | BODY MASS INDEX: 28.37 KG/M2 | TEMPERATURE: 97.3 F | SYSTOLIC BLOOD PRESSURE: 128 MMHG | WEIGHT: 198.2 LBS | HEIGHT: 70 IN | HEART RATE: 83 BPM

## 2024-02-07 DIAGNOSIS — M54.50 CHRONIC BILATERAL LOW BACK PAIN WITHOUT SCIATICA: ICD-10-CM

## 2024-02-07 DIAGNOSIS — M47.816 LUMBAR SPONDYLOSIS: ICD-10-CM

## 2024-02-07 DIAGNOSIS — M51.36 LUMBAR DEGENERATIVE DISC DISEASE: ICD-10-CM

## 2024-02-07 DIAGNOSIS — M25.552 BILATERAL HIP PAIN: ICD-10-CM

## 2024-02-07 DIAGNOSIS — M25.551 BILATERAL HIP PAIN: ICD-10-CM

## 2024-02-07 DIAGNOSIS — G89.29 CHRONIC BILATERAL LOW BACK PAIN WITHOUT SCIATICA: ICD-10-CM

## 2024-02-07 DIAGNOSIS — M17.10 ARTHRITIS OF KNEE: ICD-10-CM

## 2024-02-07 DIAGNOSIS — M25.561 BILATERAL CHRONIC KNEE PAIN: ICD-10-CM

## 2024-02-07 DIAGNOSIS — M25.562 BILATERAL CHRONIC KNEE PAIN: ICD-10-CM

## 2024-02-07 DIAGNOSIS — G89.29 BILATERAL CHRONIC KNEE PAIN: ICD-10-CM

## 2024-02-07 PROCEDURE — 1125F AMNT PAIN NOTED PAIN PRSNT: CPT | Performed by: PHYSICAL MEDICINE & REHABILITATION

## 2024-02-07 PROCEDURE — 3074F SYST BP LT 130 MM HG: CPT | Performed by: PHYSICAL MEDICINE & REHABILITATION

## 2024-02-07 PROCEDURE — 99204 OFFICE O/P NEW MOD 45 MIN: CPT | Performed by: PHYSICAL MEDICINE & REHABILITATION

## 2024-02-07 PROCEDURE — 3078F DIAST BP <80 MM HG: CPT | Performed by: PHYSICAL MEDICINE & REHABILITATION

## 2024-02-07 ASSESSMENT — PATIENT HEALTH QUESTIONNAIRE - PHQ9
CLINICAL INTERPRETATION OF PHQ2 SCORE: 2
SUM OF ALL RESPONSES TO PHQ QUESTIONS 1-9: 9
5. POOR APPETITE OR OVEREATING: 1 - SEVERAL DAYS

## 2024-02-07 ASSESSMENT — FIBROSIS 4 INDEX: FIB4 SCORE: 1.19

## 2024-02-07 ASSESSMENT — PAIN SCALES - GENERAL: PAINLEVEL: 8=MODERATE-SEVERE PAIN

## 2024-02-07 NOTE — H&P (VIEW-ONLY)
New patient note    Interventional spine and Pain  Physiatry (physical medicine and  Rehabilitation)     Date of service: See epic    Chief complaint:   Chief Complaint   Patient presents with    New Patient     Back pain        Referring provider: Alberto Paulson M.D.     HISTORY    HPI: Alberto Bowles 68 y.o.  who presents today with Diagnoses of Bilateral chronic knee pain, Arthritis of knee, Chronic bilateral low back pain without sciatica, Lumbar spondylosis, Lumbar degenerative disc disease type 2 modic changes L5-S1, and Bilateral hip pain were pertinent to this visit.    HPI    Chronic bilateral axial low back pain nonradiating which has been present for many years.  Aching in quality.  8 out of 10 in intensity.  Constant.    Chronic bilateral knee pain left worse than right 8 out of 10 intensity constant worse with walking, stairs.  He denies numbness tingling or weakness.    The patient is had physical therapy in the past.  He has a home exercise program is done this including the past 6 months.  He has had worsening of ADLs and exercise because of the pains above.     He had epidural steroid injections with no improvement. He had knee steroid injections with no improvement.       Medical records review:  I reviewed the note from the referring provider Alberto Paulson M.D. including the note dated 2/1/2024.  Chronic left knee pain.  Only temporary improvement with steroid injection..           ROS:   Red Flags ROS:   Fever, Chills, Sweats: Denies  Involuntary Weight Loss: Denies  Bladder Incontinence: Denies  Bowel Incontinence: denies  Saddle Anesthesia: Denies    All other systems reviewed and negative.       PMHx:   Past Medical History:   Diagnosis Date    Chronic back pain     Cigarette nicotine dependence          Current Outpatient Medications on File Prior to Visit   Medication Sig Dispense Refill    amLODIPine (NORVASC) 5 MG Tab Take 1 Tablet by mouth every day. 90 Tablet 3     Bempedoic Acid-Ezetimibe 180-10 MG Tab Take 1 Tablet by mouth every day. (Patient not taking: Reported on 2/7/2024) 5 Tablet 0     No current facility-administered medications on file prior to visit.        PSHx:   Past Surgical History:   Procedure Laterality Date    APPENDECTOMY      TONSILLECTOMY Bilateral        Family history   Family History   Problem Relation Age of Onset    Squamous cell carcinoma Mother     Hypertension Father     Melanoma Sister          Medications: reviewed on epic.   Outpatient Medications Marked as Taking for the 2/7/24 encounter (Office Visit) with Raymundo Rolle M.D.   Medication Sig Dispense Refill    amLODIPine (NORVASC) 5 MG Tab Take 1 Tablet by mouth every day. 90 Tablet 3        Allergies:   No Known Allergies    Social Hx:   Social History     Socioeconomic History    Marital status:      Spouse name: Not on file    Number of children: Not on file    Years of education: Not on file    Highest education level: 12th grade   Occupational History    Not on file   Tobacco Use    Smoking status: Every Day     Current packs/day: 0.50     Average packs/day: 0.5 packs/day for 49.8 years (24.9 ttl pk-yrs)     Types: Cigarettes     Start date: 5/1/1974    Smokeless tobacco: Never   Vaping Use    Vaping Use: Never used   Substance and Sexual Activity    Alcohol use: Yes     Alcohol/week: 3.6 - 4.8 oz     Types: 6 - 8 Cans of beer per week    Drug use: Never    Sexual activity: Yes   Other Topics Concern    Not on file   Social History Narrative    Not on file     Social Determinants of Health     Financial Resource Strain: Low Risk  (1/8/2024)    Overall Financial Resource Strain (CARDIA)     Difficulty of Paying Living Expenses: Not hard at all   Food Insecurity: No Food Insecurity (1/8/2024)    Hunger Vital Sign     Worried About Running Out of Food in the Last Year: Never true     Ran Out of Food in the Last Year: Never true   Transportation Needs: No Transportation Needs  "(1/8/2024)    PRAPARE - Transportation     Lack of Transportation (Medical): No     Lack of Transportation (Non-Medical): No   Physical Activity: Inactive (1/8/2024)    Exercise Vital Sign     Days of Exercise per Week: 0 days     Minutes of Exercise per Session: 0 min   Stress: No Stress Concern Present (1/8/2024)    [employer] - Occupational Stress Questionnaire     Feeling of Stress : Not at all   Social Connections: Moderately Isolated (1/8/2024)    Social Connection and Isolation Panel [NHANES]     Frequency of Communication with Friends and Family: More than three times a week     Frequency of Social Gatherings with Friends and Family: Patient refused     Attends Druze Services: Never     Active Member of Clubs or Organizations: No     Attends Club or Organization Meetings: Never     Marital Status:    Intimate Partner Violence: Not on file   Housing Stability: Unknown (1/8/2024)    Housing Stability Vital Sign     Unable to Pay for Housing in the Last Year: No     Number of Places Lived in the Last Year: Not on file     Unstable Housing in the Last Year: No         EXAMINATION     Physical Exam:   Vitals: /72 (BP Location: Right arm, Patient Position: Sitting, BP Cuff Size: Adult)   Pulse 83   Temp 36.3 °C (97.3 °F) (Temporal)   Ht 1.778 m (5' 10\")   Wt 89.9 kg (198 lb 3.2 oz)   SpO2 95%     Constitutional:   Body Habitus: Body mass index is 28.44 kg/m².  Cooperation: Fully cooperates with exam  Appearance: Well-groomed, well-nourished, not disheveled     Eyes: No scleral icterus to suggest severe liver disease, no proptosis to suggest severe hyperthyroid    ENT -no obvious auditory deficits, no obvious tongue lesions, tongue midline, no facial droop     Skin -no rashes or lesions noted     Respiratory-  breathing comfortable on room air, no audible wheezing    Cardiovascular- capillary refills less than 2 seconds.     Psychiatric- alert and oriented ×3. " Normal affect.     Gait - normal gait, no use of ambulatory device, nonantalgic. .     Musculoskeletal and Neuro -              Thoracic/Lumbar Spine/Sacral Spine/Hips   Inspection: No evidence of atrophy in bilateral lower extremities throughout     ROM: decreased active range of motion with flexion, lateral flexion, and rotation bilaterally.   There is decreased active range of motion with lumbar extension with pain.    There is pain with facet loading maneuver (extension rotation) with axial low back pain on the BILATERAL side(s) L3-4, L4-5, L5-S1    Palpation:   No tenderness to palpation in midline at T1-T12 levels. No tenderness to palpation in the left and right of the midline T1-L5, NEGATIVE for tenderness to palpation to the para-midline region in the lower lumbar levels.  palpation over SI joint: negative bilaterally    palpation in hip or over the gluteus medius tendon insertion: negative bilaterally      Lumbar spine Special tests  Neuro tension  Straight leg test negative bilaterally    Slump test negative bilaterally      HIP  FAIR test positive bilaterally    Range of motion in the RIGHT hip is decreased in flexion, extension, abduction, internal rotation, external rotation.  Range of motion in the LEFT hip is decreased in flexion, extension, abduction, internal rotation, external rotation.      SI joint tests  Observation patient sits on one buttocks: Negative  SI joint compression negative bilaterally    SI joint distraction negative bilaterally    Thigh thrust test negative bilaterally    YADY test negative bilaterally                 Key points for the international standards for neurological classification of spinal cord injury (ISNCSCI) to light touch.     Dermatome R L                                      L2 2 2   L3 2 2   L4 2 2   L5 2 2   S1 2 2   S2 2 2       Motor Exam Lower Extremities    ? Myotome R L   Hip flexion L2 5 5   Knee extension L3 5 5   Ankle dorsiflexion L4 5 5   Toe  "extension L5 5 5   Ankle plantarflexion S1 5 5         Reflexes      Babinski sign negative bilaterally   Clonus of the ankle negative bilaterally       MEDICAL DECISION MAKING    Medical records review: see under HPI section.     DATA    Labs:   Lab Results   Component Value Date/Time    SODIUM 140 01/11/2024 09:31 AM    POTASSIUM 4.1 01/11/2024 09:31 AM    CHLORIDE 102 01/11/2024 09:31 AM    CO2 25 01/11/2024 09:31 AM    ANION 13.0 01/11/2024 09:31 AM    GLUCOSE 107 (H) 01/11/2024 09:31 AM    BUN 12 01/11/2024 09:31 AM    CREATININE 0.85 01/11/2024 09:31 AM    CALCIUM 9.1 01/11/2024 09:31 AM    ASTSGOT 20 01/11/2024 09:31 AM    ALTSGPT 28 01/11/2024 09:31 AM    TBILIRUBIN 0.4 01/11/2024 09:31 AM    ALBUMIN 4.6 01/11/2024 09:31 AM    TOTPROTEIN 7.3 01/11/2024 09:31 AM    GLOBULIN 2.7 01/11/2024 09:31 AM    AGRATIO 1.7 01/11/2024 09:31 AM   ]    No results found for: \"PROTHROMBTM\", \"INR\"     Lab Results   Component Value Date/Time    WBC 10.8 01/11/2024 09:31 AM    RBC 5.46 01/11/2024 09:31 AM    HEMOGLOBIN 17.1 01/11/2024 09:31 AM    HEMATOCRIT 50.1 01/11/2024 09:31 AM    MCV 91.8 01/11/2024 09:31 AM    MCH 31.3 01/11/2024 09:31 AM    MCHC 34.1 01/11/2024 09:31 AM    MPV 11.2 01/11/2024 09:31 AM    NEUTSPOLYS 59.80 01/11/2024 09:31 AM    LYMPHOCYTES 16.50 (L) 01/11/2024 09:31 AM    MONOCYTES 10.90 01/11/2024 09:31 AM    EOSINOPHILS 11.50 (H) 01/11/2024 09:31 AM    BASOPHILS 0.60 01/11/2024 09:31 AM        Lab Results   Component Value Date/Time    HBA1C 6.3 (H) 01/11/2024 09:31 AM        Imaging:   I personally reviewed following images, these are my reads  X-ray left knee 1/11/2024  Tricompartment osteoarthritis worse in the medial compartment.    X-ray lumbar spine 2/1/2024  Degenerative disc disease worst in the lower lumbar levels.  Facet arthropathy L3-4, 4 5, L5-S1.    MRI lumbar spine 2/6/2024.    Degenerative disc disease with type II Modic changes at L5-S1.  Mild foraminal stenosis bilaterally at L4-5 and " L5-S1.  Facet arthropathy bilaterally at L3-4, L4-5, L5-S1.  Also with facet arthropathy at L2-3 bilaterally.  No significant central canal stenosis at any level.  No acute changes.        IMAGING radiology reads. I reviewed the following radiology reads                      Results for orders placed during the hospital encounter of 02/06/24    MR-LUMBAR SPINE-W/O    Impression  1. Multilevel degenerative change of the lumbar spine, most at L2-3 and L3-4.  2. Mild spinal canal narrowing at L3-4.  3. No neuroforaminal narrowing.        Results for orders placed during the hospital encounter of 02/06/24    MR-LUMBAR SPINE-W/O    Impression  1. Multilevel degenerative change of the lumbar spine, most at L2-3 and L3-4.  2. Mild spinal canal narrowing at L3-4.  3. No neuroforaminal narrowing.                                                             Results for orders placed in visit on 01/11/24    DX-KNEE COMPLETE 4+ LEFT    Impression  1.  Tricompartmental degenerative changes, moderate/severe in the medial compartment.  2.  Small joint effusion.   Results for orders placed during the hospital encounter of 02/01/24    DX-LUMBAR SPINE-2 OR 3 VIEWS    Impression  1.  Moderate multilevel degenerative change of lumbar spine.  2.  Minimal dextroconvex curvature.  3.  No fracture or subluxation.                         Diagnosis   Visit Diagnoses     ICD-10-CM   1. Bilateral chronic knee pain  M25.561    M25.562    G89.29   2. Arthritis of knee  M17.10   3. Chronic bilateral low back pain without sciatica  M54.50    G89.29   4. Lumbar spondylosis  M47.816   5. Lumbar degenerative disc disease type 2 modic changes L5-S1  M51.36   6. Bilateral hip pain  M25.551    M25.552           ASSESSMENT AND PLAN:  Alberto Gandhi Wilbur 68 y.o. male      Alberto was seen today for new patient.    Diagnoses and all orders for this visit:    Bilateral chronic knee pain  -     Referral to Physical Medicine Rehab  -     Referral to  Physical Medicine Rehab    Arthritis of knee  -     Referral to Physical Medicine Rehab  -     Referral to Physical Medicine Rehab  -     DX-KNEE COMPLETE 4+ RIGHT; Future    Chronic bilateral low back pain without sciatica    Lumbar spondylosis    Lumbar degenerative disc disease type 2 modic changes L5-S1    Bilateral hip pain  -     DX-HIP-BILATERAL-WITH PELVIS-3/4 VIEWS; Future        Physical therapy: I ordered physical therapy to focus on strengthening and stretching.     home exercise program: I provided the patient with a strengthening and stretching with a home exercise program     Diagnostic workup: As above    Medications:   Medications: Acetaminophen up to 1000 mg 3 times daily as needed not to exceed 3000 mg per 24-hours.    Interventional program:   I have ordered a LEFT  knee genicular nerve block.  If the genicular nerve block is positive then we will plan to proceed with LEFT  knee genicular nerve neurotomy.    The risks benefits and alternatives to this procedure were discussed and the patient wishes to proceed with the procedure. Risks include but are not limited to damage to surrounding structures, infection, bleeding, worsening of pain which can be permanent, weakness which can be permanent. Benefits include pain relief, improved function. Alternatives includes not doing the procedure.        Follow-up: After the above diagnostic studies         Please note that this dictation was created using voice recognition software. I have made every reasonable attempt to correct obvious errors but there may be errors of grammar and content that I may have overlooked prior to finalization of this note.      Raymundo Rolle MD  Physical Medicine and Rehabilitation  Interventional Spine and Sports Physiatry  [facility] Alberto Paulson M.D.

## 2024-02-07 NOTE — PROGRESS NOTES
New patient note    Interventional spine and Pain  Physiatry (physical medicine and  Rehabilitation)     Date of service: See epic    Chief complaint:   Chief Complaint   Patient presents with    New Patient     Back pain        Referring provider: Alberto Paulson M.D.     HISTORY    HPI: Alberto Bowles 68 y.o.  who presents today with Diagnoses of Bilateral chronic knee pain, Arthritis of knee, Chronic bilateral low back pain without sciatica, Lumbar spondylosis, Lumbar degenerative disc disease type 2 modic changes L5-S1, and Bilateral hip pain were pertinent to this visit.    HPI    Chronic bilateral axial low back pain nonradiating which has been present for many years.  Aching in quality.  8 out of 10 in intensity.  Constant.    Chronic bilateral knee pain left worse than right 8 out of 10 intensity constant worse with walking, stairs.  He denies numbness tingling or weakness.    The patient is had physical therapy in the past.  He has a home exercise program is done this including the past 6 months.  He has had worsening of ADLs and exercise because of the pains above.     He had epidural steroid injections with no improvement. He had knee steroid injections with no improvement.       Medical records review:  I reviewed the note from the referring provider Alberto Paulson M.D. including the note dated 2/1/2024.  Chronic left knee pain.  Only temporary improvement with steroid injection..           ROS:   Red Flags ROS:   Fever, Chills, Sweats: Denies  Involuntary Weight Loss: Denies  Bladder Incontinence: Denies  Bowel Incontinence: denies  Saddle Anesthesia: Denies    All other systems reviewed and negative.       PMHx:   Past Medical History:   Diagnosis Date    Chronic back pain     Cigarette nicotine dependence          Current Outpatient Medications on File Prior to Visit   Medication Sig Dispense Refill    amLODIPine (NORVASC) 5 MG Tab Take 1 Tablet by mouth every day. 90 Tablet 3     Bempedoic Acid-Ezetimibe 180-10 MG Tab Take 1 Tablet by mouth every day. (Patient not taking: Reported on 2/7/2024) 5 Tablet 0     No current facility-administered medications on file prior to visit.        PSHx:   Past Surgical History:   Procedure Laterality Date    APPENDECTOMY      TONSILLECTOMY Bilateral        Family history   Family History   Problem Relation Age of Onset    Squamous cell carcinoma Mother     Hypertension Father     Melanoma Sister          Medications: reviewed on epic.   Outpatient Medications Marked as Taking for the 2/7/24 encounter (Office Visit) with Raymundo Rolle M.D.   Medication Sig Dispense Refill    amLODIPine (NORVASC) 5 MG Tab Take 1 Tablet by mouth every day. 90 Tablet 3        Allergies:   No Known Allergies    Social Hx:   Social History     Socioeconomic History    Marital status:      Spouse name: Not on file    Number of children: Not on file    Years of education: Not on file    Highest education level: 12th grade   Occupational History    Not on file   Tobacco Use    Smoking status: Every Day     Current packs/day: 0.50     Average packs/day: 0.5 packs/day for 49.8 years (24.9 ttl pk-yrs)     Types: Cigarettes     Start date: 5/1/1974    Smokeless tobacco: Never   Vaping Use    Vaping Use: Never used   Substance and Sexual Activity    Alcohol use: Yes     Alcohol/week: 3.6 - 4.8 oz     Types: 6 - 8 Cans of beer per week    Drug use: Never    Sexual activity: Yes   Other Topics Concern    Not on file   Social History Narrative    Not on file     Social Determinants of Health     Financial Resource Strain: Low Risk  (1/8/2024)    Overall Financial Resource Strain (CARDIA)     Difficulty of Paying Living Expenses: Not hard at all   Food Insecurity: No Food Insecurity (1/8/2024)    Hunger Vital Sign     Worried About Running Out of Food in the Last Year: Never true     Ran Out of Food in the Last Year: Never true   Transportation Needs: No Transportation Needs  "(1/8/2024)    PRAPARE - Transportation     Lack of Transportation (Medical): No     Lack of Transportation (Non-Medical): No   Physical Activity: Inactive (1/8/2024)    Exercise Vital Sign     Days of Exercise per Week: 0 days     Minutes of Exercise per Session: 0 min   Stress: No Stress Concern Present (1/8/2024)    [employer] - Occupational Stress Questionnaire     Feeling of Stress : Not at all   Social Connections: Moderately Isolated (1/8/2024)    Social Connection and Isolation Panel [NHANES]     Frequency of Communication with Friends and Family: More than three times a week     Frequency of Social Gatherings with Friends and Family: Patient refused     Attends Worship Services: Never     Active Member of Clubs or Organizations: No     Attends Club or Organization Meetings: Never     Marital Status:    Intimate Partner Violence: Not on file   Housing Stability: Unknown (1/8/2024)    Housing Stability Vital Sign     Unable to Pay for Housing in the Last Year: No     Number of Places Lived in the Last Year: Not on file     Unstable Housing in the Last Year: No         EXAMINATION     Physical Exam:   Vitals: /72 (BP Location: Right arm, Patient Position: Sitting, BP Cuff Size: Adult)   Pulse 83   Temp 36.3 °C (97.3 °F) (Temporal)   Ht 1.778 m (5' 10\")   Wt 89.9 kg (198 lb 3.2 oz)   SpO2 95%     Constitutional:   Body Habitus: Body mass index is 28.44 kg/m².  Cooperation: Fully cooperates with exam  Appearance: Well-groomed, well-nourished, not disheveled     Eyes: No scleral icterus to suggest severe liver disease, no proptosis to suggest severe hyperthyroid    ENT -no obvious auditory deficits, no obvious tongue lesions, tongue midline, no facial droop     Skin -no rashes or lesions noted     Respiratory-  breathing comfortable on room air, no audible wheezing    Cardiovascular- capillary refills less than 2 seconds.     Psychiatric- alert and oriented ×3. " Normal affect.     Gait - normal gait, no use of ambulatory device, nonantalgic. .     Musculoskeletal and Neuro -              Thoracic/Lumbar Spine/Sacral Spine/Hips   Inspection: No evidence of atrophy in bilateral lower extremities throughout     ROM: decreased active range of motion with flexion, lateral flexion, and rotation bilaterally.   There is decreased active range of motion with lumbar extension with pain.    There is pain with facet loading maneuver (extension rotation) with axial low back pain on the BILATERAL side(s) L3-4, L4-5, L5-S1    Palpation:   No tenderness to palpation in midline at T1-T12 levels. No tenderness to palpation in the left and right of the midline T1-L5, NEGATIVE for tenderness to palpation to the para-midline region in the lower lumbar levels.  palpation over SI joint: negative bilaterally    palpation in hip or over the gluteus medius tendon insertion: negative bilaterally      Lumbar spine Special tests  Neuro tension  Straight leg test negative bilaterally    Slump test negative bilaterally      HIP  FAIR test positive bilaterally    Range of motion in the RIGHT hip is decreased in flexion, extension, abduction, internal rotation, external rotation.  Range of motion in the LEFT hip is decreased in flexion, extension, abduction, internal rotation, external rotation.      SI joint tests  Observation patient sits on one buttocks: Negative  SI joint compression negative bilaterally    SI joint distraction negative bilaterally    Thigh thrust test negative bilaterally    YADY test negative bilaterally                 Key points for the international standards for neurological classification of spinal cord injury (ISNCSCI) to light touch.     Dermatome R L                                      L2 2 2   L3 2 2   L4 2 2   L5 2 2   S1 2 2   S2 2 2       Motor Exam Lower Extremities    ? Myotome R L   Hip flexion L2 5 5   Knee extension L3 5 5   Ankle dorsiflexion L4 5 5   Toe  "extension L5 5 5   Ankle plantarflexion S1 5 5         Reflexes      Babinski sign negative bilaterally   Clonus of the ankle negative bilaterally       MEDICAL DECISION MAKING    Medical records review: see under HPI section.     DATA    Labs:   Lab Results   Component Value Date/Time    SODIUM 140 01/11/2024 09:31 AM    POTASSIUM 4.1 01/11/2024 09:31 AM    CHLORIDE 102 01/11/2024 09:31 AM    CO2 25 01/11/2024 09:31 AM    ANION 13.0 01/11/2024 09:31 AM    GLUCOSE 107 (H) 01/11/2024 09:31 AM    BUN 12 01/11/2024 09:31 AM    CREATININE 0.85 01/11/2024 09:31 AM    CALCIUM 9.1 01/11/2024 09:31 AM    ASTSGOT 20 01/11/2024 09:31 AM    ALTSGPT 28 01/11/2024 09:31 AM    TBILIRUBIN 0.4 01/11/2024 09:31 AM    ALBUMIN 4.6 01/11/2024 09:31 AM    TOTPROTEIN 7.3 01/11/2024 09:31 AM    GLOBULIN 2.7 01/11/2024 09:31 AM    AGRATIO 1.7 01/11/2024 09:31 AM   ]    No results found for: \"PROTHROMBTM\", \"INR\"     Lab Results   Component Value Date/Time    WBC 10.8 01/11/2024 09:31 AM    RBC 5.46 01/11/2024 09:31 AM    HEMOGLOBIN 17.1 01/11/2024 09:31 AM    HEMATOCRIT 50.1 01/11/2024 09:31 AM    MCV 91.8 01/11/2024 09:31 AM    MCH 31.3 01/11/2024 09:31 AM    MCHC 34.1 01/11/2024 09:31 AM    MPV 11.2 01/11/2024 09:31 AM    NEUTSPOLYS 59.80 01/11/2024 09:31 AM    LYMPHOCYTES 16.50 (L) 01/11/2024 09:31 AM    MONOCYTES 10.90 01/11/2024 09:31 AM    EOSINOPHILS 11.50 (H) 01/11/2024 09:31 AM    BASOPHILS 0.60 01/11/2024 09:31 AM        Lab Results   Component Value Date/Time    HBA1C 6.3 (H) 01/11/2024 09:31 AM        Imaging:   I personally reviewed following images, these are my reads  X-ray left knee 1/11/2024  Tricompartment osteoarthritis worse in the medial compartment.    X-ray lumbar spine 2/1/2024  Degenerative disc disease worst in the lower lumbar levels.  Facet arthropathy L3-4, 4 5, L5-S1.    MRI lumbar spine 2/6/2024.    Degenerative disc disease with type II Modic changes at L5-S1.  Mild foraminal stenosis bilaterally at L4-5 and " L5-S1.  Facet arthropathy bilaterally at L3-4, L4-5, L5-S1.  Also with facet arthropathy at L2-3 bilaterally.  No significant central canal stenosis at any level.  No acute changes.        IMAGING radiology reads. I reviewed the following radiology reads                      Results for orders placed during the hospital encounter of 02/06/24    MR-LUMBAR SPINE-W/O    Impression  1. Multilevel degenerative change of the lumbar spine, most at L2-3 and L3-4.  2. Mild spinal canal narrowing at L3-4.  3. No neuroforaminal narrowing.        Results for orders placed during the hospital encounter of 02/06/24    MR-LUMBAR SPINE-W/O    Impression  1. Multilevel degenerative change of the lumbar spine, most at L2-3 and L3-4.  2. Mild spinal canal narrowing at L3-4.  3. No neuroforaminal narrowing.                                                             Results for orders placed in visit on 01/11/24    DX-KNEE COMPLETE 4+ LEFT    Impression  1.  Tricompartmental degenerative changes, moderate/severe in the medial compartment.  2.  Small joint effusion.   Results for orders placed during the hospital encounter of 02/01/24    DX-LUMBAR SPINE-2 OR 3 VIEWS    Impression  1.  Moderate multilevel degenerative change of lumbar spine.  2.  Minimal dextroconvex curvature.  3.  No fracture or subluxation.                         Diagnosis   Visit Diagnoses     ICD-10-CM   1. Bilateral chronic knee pain  M25.561    M25.562    G89.29   2. Arthritis of knee  M17.10   3. Chronic bilateral low back pain without sciatica  M54.50    G89.29   4. Lumbar spondylosis  M47.816   5. Lumbar degenerative disc disease type 2 modic changes L5-S1  M51.36   6. Bilateral hip pain  M25.551    M25.552           ASSESSMENT AND PLAN:  Alberto Gandhi Wilbur 68 y.o. male      Alberto was seen today for new patient.    Diagnoses and all orders for this visit:    Bilateral chronic knee pain  -     Referral to Physical Medicine Rehab  -     Referral to  Physical Medicine Rehab    Arthritis of knee  -     Referral to Physical Medicine Rehab  -     Referral to Physical Medicine Rehab  -     DX-KNEE COMPLETE 4+ RIGHT; Future    Chronic bilateral low back pain without sciatica    Lumbar spondylosis    Lumbar degenerative disc disease type 2 modic changes L5-S1    Bilateral hip pain  -     DX-HIP-BILATERAL-WITH PELVIS-3/4 VIEWS; Future        Physical therapy: I ordered physical therapy to focus on strengthening and stretching.     home exercise program: I provided the patient with a strengthening and stretching with a home exercise program     Diagnostic workup: As above    Medications:   Medications: Acetaminophen up to 1000 mg 3 times daily as needed not to exceed 3000 mg per 24-hours.    Interventional program:   I have ordered a LEFT  knee genicular nerve block.  If the genicular nerve block is positive then we will plan to proceed with LEFT  knee genicular nerve neurotomy.    The risks benefits and alternatives to this procedure were discussed and the patient wishes to proceed with the procedure. Risks include but are not limited to damage to surrounding structures, infection, bleeding, worsening of pain which can be permanent, weakness which can be permanent. Benefits include pain relief, improved function. Alternatives includes not doing the procedure.        Follow-up: After the above diagnostic studies         Please note that this dictation was created using voice recognition software. I have made every reasonable attempt to correct obvious errors but there may be errors of grammar and content that I may have overlooked prior to finalization of this note.      Raymundo Rolle MD  Physical Medicine and Rehabilitation  Interventional Spine and Sports Physiatry  [facility] Alberto Paulson M.D.

## 2024-02-07 NOTE — H&P (VIEW-ONLY)
New patient note    Interventional spine and Pain  Physiatry (physical medicine and  Rehabilitation)     Date of service: See epic    Chief complaint:   Chief Complaint   Patient presents with    New Patient     Back pain        Referring provider: Alberto Paulson M.D.     HISTORY    HPI: Alberto Bowles 68 y.o.  who presents today with Diagnoses of Bilateral chronic knee pain, Arthritis of knee, Chronic bilateral low back pain without sciatica, Lumbar spondylosis, Lumbar degenerative disc disease type 2 modic changes L5-S1, and Bilateral hip pain were pertinent to this visit.    HPI    Chronic bilateral axial low back pain nonradiating which has been present for many years.  Aching in quality.  8 out of 10 in intensity.  Constant.    Chronic bilateral knee pain left worse than right 8 out of 10 intensity constant worse with walking, stairs.  He denies numbness tingling or weakness.    The patient is had physical therapy in the past.  He has a home exercise program is done this including the past 6 months.  He has had worsening of ADLs and exercise because of the pains above.     He had epidural steroid injections with no improvement. He had knee steroid injections with no improvement.       Medical records review:  I reviewed the note from the referring provider Alberto Paulson M.D. including the note dated 2/1/2024.  Chronic left knee pain.  Only temporary improvement with steroid injection..           ROS:   Red Flags ROS:   Fever, Chills, Sweats: Denies  Involuntary Weight Loss: Denies  Bladder Incontinence: Denies  Bowel Incontinence: denies  Saddle Anesthesia: Denies    All other systems reviewed and negative.       PMHx:   Past Medical History:   Diagnosis Date    Chronic back pain     Cigarette nicotine dependence          Current Outpatient Medications on File Prior to Visit   Medication Sig Dispense Refill    amLODIPine (NORVASC) 5 MG Tab Take 1 Tablet by mouth every day. 90 Tablet 3     Bempedoic Acid-Ezetimibe 180-10 MG Tab Take 1 Tablet by mouth every day. (Patient not taking: Reported on 2/7/2024) 5 Tablet 0     No current facility-administered medications on file prior to visit.        PSHx:   Past Surgical History:   Procedure Laterality Date    APPENDECTOMY      TONSILLECTOMY Bilateral        Family history   Family History   Problem Relation Age of Onset    Squamous cell carcinoma Mother     Hypertension Father     Melanoma Sister          Medications: reviewed on epic.   Outpatient Medications Marked as Taking for the 2/7/24 encounter (Office Visit) with Raymundo Rolle M.D.   Medication Sig Dispense Refill    amLODIPine (NORVASC) 5 MG Tab Take 1 Tablet by mouth every day. 90 Tablet 3        Allergies:   No Known Allergies    Social Hx:   Social History     Socioeconomic History    Marital status:      Spouse name: Not on file    Number of children: Not on file    Years of education: Not on file    Highest education level: 12th grade   Occupational History    Not on file   Tobacco Use    Smoking status: Every Day     Current packs/day: 0.50     Average packs/day: 0.5 packs/day for 49.8 years (24.9 ttl pk-yrs)     Types: Cigarettes     Start date: 5/1/1974    Smokeless tobacco: Never   Vaping Use    Vaping Use: Never used   Substance and Sexual Activity    Alcohol use: Yes     Alcohol/week: 3.6 - 4.8 oz     Types: 6 - 8 Cans of beer per week    Drug use: Never    Sexual activity: Yes   Other Topics Concern    Not on file   Social History Narrative    Not on file     Social Determinants of Health     Financial Resource Strain: Low Risk  (1/8/2024)    Overall Financial Resource Strain (CARDIA)     Difficulty of Paying Living Expenses: Not hard at all   Food Insecurity: No Food Insecurity (1/8/2024)    Hunger Vital Sign     Worried About Running Out of Food in the Last Year: Never true     Ran Out of Food in the Last Year: Never true   Transportation Needs: No Transportation Needs  "(1/8/2024)    PRAPARE - Transportation     Lack of Transportation (Medical): No     Lack of Transportation (Non-Medical): No   Physical Activity: Inactive (1/8/2024)    Exercise Vital Sign     Days of Exercise per Week: 0 days     Minutes of Exercise per Session: 0 min   Stress: No Stress Concern Present (1/8/2024)    [employer] [place] [employer] - Occupational Stress Questionnaire     Feeling of Stress : Not at all   Social Connections: Moderately Isolated (1/8/2024)    Social Connection and Isolation Panel [NHANES]     Frequency of Communication with Friends and Family: More than three times a week     Frequency of Social Gatherings with Friends and Family: Patient refused     Attends Gnosticist Services: Never     Active Member of Clubs or Organizations: No     Attends Club or Organization Meetings: Never     Marital Status:    Intimate Partner Violence: Not on file   Housing Stability: Unknown (1/8/2024)    Housing Stability Vital Sign     Unable to Pay for Housing in the Last Year: No     Number of Places Lived in the Last Year: Not on file     Unstable Housing in the Last Year: No         EXAMINATION     Physical Exam:   Vitals: /72 (BP Location: Right arm, Patient Position: Sitting, BP Cuff Size: Adult)   Pulse 83   Temp 36.3 °C (97.3 °F) (Temporal)   Ht 1.778 m (5' 10\")   Wt 89.9 kg (198 lb 3.2 oz)   SpO2 95%     Constitutional:   Body Habitus: Body mass index is 28.44 kg/m².  Cooperation: Fully cooperates with exam  Appearance: Well-groomed, well-nourished, not disheveled     Eyes: No scleral icterus to suggest severe liver disease, no proptosis to suggest severe hyperthyroid    ENT -no obvious auditory deficits, no obvious tongue lesions, tongue midline, no facial droop     Skin -no rashes or lesions noted     Respiratory-  breathing comfortable on room air, no audible wheezing    Cardiovascular- capillary refills less than 2 seconds.     Psychiatric- alert and oriented ×3. " Normal affect.     Gait - normal gait, no use of ambulatory device, nonantalgic. .     Musculoskeletal and Neuro -              Thoracic/Lumbar Spine/Sacral Spine/Hips   Inspection: No evidence of atrophy in bilateral lower extremities throughout     ROM: decreased active range of motion with flexion, lateral flexion, and rotation bilaterally.   There is decreased active range of motion with lumbar extension with pain.    There is pain with facet loading maneuver (extension rotation) with axial low back pain on the BILATERAL side(s) L3-4, L4-5, L5-S1    Palpation:   No tenderness to palpation in midline at T1-T12 levels. No tenderness to palpation in the left and right of the midline T1-L5, NEGATIVE for tenderness to palpation to the para-midline region in the lower lumbar levels.  palpation over SI joint: negative bilaterally    palpation in hip or over the gluteus medius tendon insertion: negative bilaterally      Lumbar spine Special tests  Neuro tension  Straight leg test negative bilaterally    Slump test negative bilaterally      HIP  FAIR test positive bilaterally    Range of motion in the RIGHT hip is decreased in flexion, extension, abduction, internal rotation, external rotation.  Range of motion in the LEFT hip is decreased in flexion, extension, abduction, internal rotation, external rotation.      SI joint tests  Observation patient sits on one buttocks: Negative  SI joint compression negative bilaterally    SI joint distraction negative bilaterally    Thigh thrust test negative bilaterally    YADY test negative bilaterally                 Key points for the international standards for neurological classification of spinal cord injury (ISNCSCI) to light touch.     Dermatome R L                                      L2 2 2   L3 2 2   L4 2 2   L5 2 2   S1 2 2   S2 2 2       Motor Exam Lower Extremities    ? Myotome R L   Hip flexion L2 5 5   Knee extension L3 5 5   Ankle dorsiflexion L4 5 5   Toe  "extension L5 5 5   Ankle plantarflexion S1 5 5         Reflexes      Babinski sign negative bilaterally   Clonus of the ankle negative bilaterally       MEDICAL DECISION MAKING    Medical records review: see under HPI section.     DATA    Labs:   Lab Results   Component Value Date/Time    SODIUM 140 01/11/2024 09:31 AM    POTASSIUM 4.1 01/11/2024 09:31 AM    CHLORIDE 102 01/11/2024 09:31 AM    CO2 25 01/11/2024 09:31 AM    ANION 13.0 01/11/2024 09:31 AM    GLUCOSE 107 (H) 01/11/2024 09:31 AM    BUN 12 01/11/2024 09:31 AM    CREATININE 0.85 01/11/2024 09:31 AM    CALCIUM 9.1 01/11/2024 09:31 AM    ASTSGOT 20 01/11/2024 09:31 AM    ALTSGPT 28 01/11/2024 09:31 AM    TBILIRUBIN 0.4 01/11/2024 09:31 AM    ALBUMIN 4.6 01/11/2024 09:31 AM    TOTPROTEIN 7.3 01/11/2024 09:31 AM    GLOBULIN 2.7 01/11/2024 09:31 AM    AGRATIO 1.7 01/11/2024 09:31 AM   ]    No results found for: \"PROTHROMBTM\", \"INR\"     Lab Results   Component Value Date/Time    WBC 10.8 01/11/2024 09:31 AM    RBC 5.46 01/11/2024 09:31 AM    HEMOGLOBIN 17.1 01/11/2024 09:31 AM    HEMATOCRIT 50.1 01/11/2024 09:31 AM    MCV 91.8 01/11/2024 09:31 AM    MCH 31.3 01/11/2024 09:31 AM    MCHC 34.1 01/11/2024 09:31 AM    MPV 11.2 01/11/2024 09:31 AM    NEUTSPOLYS 59.80 01/11/2024 09:31 AM    LYMPHOCYTES 16.50 (L) 01/11/2024 09:31 AM    MONOCYTES 10.90 01/11/2024 09:31 AM    EOSINOPHILS 11.50 (H) 01/11/2024 09:31 AM    BASOPHILS 0.60 01/11/2024 09:31 AM        Lab Results   Component Value Date/Time    HBA1C 6.3 (H) 01/11/2024 09:31 AM        Imaging:   I personally reviewed following images, these are my reads  X-ray left knee 1/11/2024  Tricompartment osteoarthritis worse in the medial compartment.    X-ray lumbar spine 2/1/2024  Degenerative disc disease worst in the lower lumbar levels.  Facet arthropathy L3-4, 4 5, L5-S1.    MRI lumbar spine 2/6/2024.    Degenerative disc disease with type II Modic changes at L5-S1.  Mild foraminal stenosis bilaterally at L4-5 and " L5-S1.  Facet arthropathy bilaterally at L3-4, L4-5, L5-S1.  Also with facet arthropathy at L2-3 bilaterally.  No significant central canal stenosis at any level.  No acute changes.        IMAGING radiology reads. I reviewed the following radiology reads                      Results for orders placed during the hospital encounter of 02/06/24    MR-LUMBAR SPINE-W/O    Impression  1. Multilevel degenerative change of the lumbar spine, most at L2-3 and L3-4.  2. Mild spinal canal narrowing at L3-4.  3. No neuroforaminal narrowing.        Results for orders placed during the hospital encounter of 02/06/24    MR-LUMBAR SPINE-W/O    Impression  1. Multilevel degenerative change of the lumbar spine, most at L2-3 and L3-4.  2. Mild spinal canal narrowing at L3-4.  3. No neuroforaminal narrowing.                                                             Results for orders placed in visit on 01/11/24    DX-KNEE COMPLETE 4+ LEFT    Impression  1.  Tricompartmental degenerative changes, moderate/severe in the medial compartment.  2.  Small joint effusion.   Results for orders placed during the hospital encounter of 02/01/24    DX-LUMBAR SPINE-2 OR 3 VIEWS    Impression  1.  Moderate multilevel degenerative change of lumbar spine.  2.  Minimal dextroconvex curvature.  3.  No fracture or subluxation.                         Diagnosis   Visit Diagnoses     ICD-10-CM   1. Bilateral chronic knee pain  M25.561    M25.562    G89.29   2. Arthritis of knee  M17.10   3. Chronic bilateral low back pain without sciatica  M54.50    G89.29   4. Lumbar spondylosis  M47.816   5. Lumbar degenerative disc disease type 2 modic changes L5-S1  M51.36   6. Bilateral hip pain  M25.551    M25.552           ASSESSMENT AND PLAN:  Alberto Gandhi Wilbur 68 y.o. male      Alberto was seen today for new patient.    Diagnoses and all orders for this visit:    Bilateral chronic knee pain  -     Referral to Physical Medicine Rehab  -     Referral to  Physical Medicine Rehab    Arthritis of knee  -     Referral to Physical Medicine Rehab  -     Referral to Physical Medicine Rehab  -     DX-KNEE COMPLETE 4+ RIGHT; Future    Chronic bilateral low back pain without sciatica    Lumbar spondylosis    Lumbar degenerative disc disease type 2 modic changes L5-S1    Bilateral hip pain  -     DX-HIP-BILATERAL-WITH PELVIS-3/4 VIEWS; Future        Physical therapy: I ordered physical therapy to focus on strengthening and stretching.     home exercise program: I provided the patient with a strengthening and stretching with a home exercise program     Diagnostic workup: As above    Medications:   Medications: Acetaminophen up to 1000 mg 3 times daily as needed not to exceed 3000 mg per 24-hours.    Interventional program:   I have ordered a LEFT  knee genicular nerve block.  If the genicular nerve block is positive then we will plan to proceed with LEFT  knee genicular nerve neurotomy.    The risks benefits and alternatives to this procedure were discussed and the patient wishes to proceed with the procedure. Risks include but are not limited to damage to surrounding structures, infection, bleeding, worsening of pain which can be permanent, weakness which can be permanent. Benefits include pain relief, improved function. Alternatives includes not doing the procedure.        Follow-up: After the above diagnostic studies         Please note that this dictation was created using voice recognition software. I have made every reasonable attempt to correct obvious errors but there may be errors of grammar and content that I may have overlooked prior to finalization of this note.      Raymundo Rolle MD  Physical Medicine and Rehabilitation  Interventional Spine and Sports Physiatry  [facility] Alberto Paulson M.D.

## 2024-02-07 NOTE — Clinical Note
Dr Paulson,   Thank you for the referral.  I think we will have good options with treatments for his facet pain for his back.  He also has arthritis in his knees and he is not interested in knee replacement.  For patient such as these we do have great options with genicular nerve block and neurotomy and potential stimulation.  I hope that you are having a great day.   Raymundo Rolle MD

## 2024-02-08 ENCOUNTER — HOSPITAL ENCOUNTER (OUTPATIENT)
Dept: RADIOLOGY | Facility: MEDICAL CENTER | Age: 69
End: 2024-02-08
Attending: PHYSICAL MEDICINE & REHABILITATION
Payer: MEDICARE

## 2024-02-08 DIAGNOSIS — M25.551 BILATERAL HIP PAIN: ICD-10-CM

## 2024-02-08 DIAGNOSIS — M17.10 ARTHRITIS OF KNEE: ICD-10-CM

## 2024-02-08 DIAGNOSIS — M25.552 BILATERAL HIP PAIN: ICD-10-CM

## 2024-02-08 PROCEDURE — 73522 X-RAY EXAM HIPS BI 3-4 VIEWS: CPT

## 2024-02-08 PROCEDURE — 73564 X-RAY EXAM KNEE 4 OR MORE: CPT | Mod: RT

## 2024-02-13 ENCOUNTER — APPOINTMENT (OUTPATIENT)
Dept: RADIOLOGY | Facility: REHABILITATION | Age: 69
End: 2024-02-13
Attending: PHYSICAL MEDICINE & REHABILITATION
Payer: MEDICARE

## 2024-02-13 ENCOUNTER — HOSPITAL ENCOUNTER (OUTPATIENT)
Facility: REHABILITATION | Age: 69
End: 2024-02-13
Attending: PHYSICAL MEDICINE & REHABILITATION | Admitting: PHYSICAL MEDICINE & REHABILITATION
Payer: MEDICARE

## 2024-02-13 VITALS
OXYGEN SATURATION: 95 % | TEMPERATURE: 97 F | RESPIRATION RATE: 16 BRPM | SYSTOLIC BLOOD PRESSURE: 151 MMHG | DIASTOLIC BLOOD PRESSURE: 96 MMHG | BODY MASS INDEX: 28.25 KG/M2 | HEIGHT: 70 IN | WEIGHT: 197.31 LBS | HEART RATE: 70 BPM

## 2024-02-13 PROCEDURE — 64454 NJX AA&/STRD GNCLR NRV BRNCH: CPT

## 2024-02-13 PROCEDURE — 700111 HCHG RX REV CODE 636 W/ 250 OVERRIDE (IP): Mod: JZ

## 2024-02-13 PROCEDURE — 700101 HCHG RX REV CODE 250

## 2024-02-13 RX ORDER — LIDOCAINE HYDROCHLORIDE 20 MG/ML
INJECTION, SOLUTION EPIDURAL; INFILTRATION; INTRACAUDAL; PERINEURAL
Status: COMPLETED
Start: 2024-02-13 | End: 2024-02-13

## 2024-02-13 RX ORDER — LIDOCAINE HYDROCHLORIDE 10 MG/ML
INJECTION, SOLUTION EPIDURAL; INFILTRATION; INTRACAUDAL; PERINEURAL
Status: COMPLETED
Start: 2024-02-13 | End: 2024-02-13

## 2024-02-13 RX ADMIN — LIDOCAINE HYDROCHLORIDE 10 ML: 10 INJECTION, SOLUTION EPIDURAL; INFILTRATION; INTRACAUDAL; PERINEURAL at 09:35

## 2024-02-13 RX ADMIN — LIDOCAINE HYDROCHLORIDE 5 ML: 20 INJECTION, SOLUTION EPIDURAL; INFILTRATION; INTRACAUDAL at 09:36

## 2024-02-13 ASSESSMENT — FIBROSIS 4 INDEX: FIB4 SCORE: 1.19

## 2024-02-13 ASSESSMENT — PAIN DESCRIPTION - PAIN TYPE: TYPE: CHRONIC PAIN

## 2024-02-13 NOTE — OR SURGEON
Immediate Post OP Note    Pre-Op Diagnosis Codes:     * Bilateral chronic knee pain [M25.561, M25.562, G89.29]     * Arthritis of knee [M17.10]      Post-Op Diagnosis Codes:     * Bilateral chronic knee pain [M25.561, M25.562, G89.29]     * Arthritis of knee [M17.10]      Procedure(s):  LEFT knee genicular nerve blocks with fluoroscopic guidance - Wound Class: Clean    Surgeon(s):  Raymundo Rolle M.D.    Anesthesiologist/Type of Anesthesia:  No anesthesia staff entered./Local    Surgical Staff:  Circulator: Diane Trevino R.N.  Scrub Person: Neelima Momin R.N.  Radiology Technologist: Ab Nicolas    Specimens removed if any:  * No specimens in log *    Estimated Blood Loss: None    Findings: None    Complications: None        2/13/2024 9:43 AM Raymundo Rolle M.D.

## 2024-02-13 NOTE — OP REPORT
Patient Name: Alberto Bowles 68 y.o. male    MRN: [id number]     Date of Service: 2/13/2024    Physician/s: Raymundo Rolle MD    Pre-operative Diagnosis: left Knee osteoarthrosis, Knee pain.      Post-operative Diagnosis: left Knee osteoarthrosis, Knee pain.     Procedure: left Knee Genicular Nerve Blocks     Description of procedure:  The risks, benefits, and alternatives of the procedure were reviewed and discussed with the patient.  Written informed consent was freely obtained. A pre-procedural time-out was conducted by the physician verifying patient’s identity, procedure to be performed, procedure site and side, and allergy verification. Appropriate equipment was determined to be in place for the procedure.          The patient's vital signs were carefully monitored before, throughout, and after the procedure.    In the fluoroscopy suite the patient was placed in a supine position, the knee was flexed with a pillow/towels underneath for support, and the skin was prepped and draped in the usual sterile fashion. The fluoroscope was placed over the left knee at an true AP position, and THREE targets for injection were marked superior lateral genicular nerve adjacent to the femoral metaphysis on the lateral aspect of the middle third of the, superior medial genicular nerve adjacent to the femoral  metaphysis on the medial aspect and in the middle third of the lateral view, inferior medial and medial tibial metaphysis on the AP view and the middle third on the lateral view. A 25g spinal needle was then advanced into the superior lateral, superior medial, inferior medial, and superior midline target points for the genicular nerves. The needle tips were then verified by AP and lateral views.  Following negative aspiration, 1mL of 2% lidocaine was then injected at the above levels, and the needles were removed intact after restyleted. The patient's knee was covered with a 4x4 gauze, the area was cleansed with  sterile normal saline, and a dressing was applied.     There were no complications noted, was hemodynamically stable, and tolerated the procedure well.     The patient had 100% pain relief postprocedure  Preprocedure pain 6/10 NRS  Postprocedure pain 0 /10 NRS     Follow-up as scheduled    Raymundo Rolle MD  Interventional Spine and Pain  Physical Medicine and Rehabilitation  [facility]           CPT  Suprapatellar genicular nerve block, Superomedial genicular nerve block, Inferomedial genicular nerve block [id number]

## 2024-02-13 NOTE — INTERVAL H&P NOTE
H&P reviewed. The patient was examined and there are no changes to the H&P     Lungs clear to auscultation bilaterally.  No abdominal tenderness.  Heart regular rate and rhythm.  Vitals reviewed.    Proceed with the originally scheduled procedure.  The risks, benefits and alternatives were discussed.  The patient understands these.    Pre-Op Diagnosis Codes:     * Bilateral chronic knee pain [M25.561, M25.562, G89.29]     * Arthritis of knee [M17.10]  Procedure(s):  LEFT knee genicular nerve blocks with fluoroscopic guidance    Raymundo Rolle MD  Physical Medicine and Rehabilitation  Interventional Spine and Sports Physiatry  [facility]

## 2024-02-13 NOTE — PROGRESS NOTES
0815 Pt arrived to pre-procedure area. Procedure & plan for recovery reviewed, site confirmed, & consent signed. Allergies & current medications verified. Appointed  waiting in lobby. Printed discharge instructions discussed & signed. Pt denies taking NSAIDS or anticoagulants in the last 5 days. MD to bedside prior to procedure.     0963 Pt to recovery area s/p genicular nerve blocks & updates received from procedure RN. Pt reports relief in pain at this time. Dr. Rolle to bedside for post-procedure evaluation.      1005 Pt ambulates without difficulty & meets DC criteria. RN assisted pt off unit to appointed .

## 2024-02-14 ENCOUNTER — TELEPHONE (OUTPATIENT)
Dept: PHYSICAL MEDICINE AND REHAB | Facility: MEDICAL CENTER | Age: 69
End: 2024-02-14
Payer: MEDICARE

## 2024-02-14 NOTE — TELEPHONE ENCOUNTER
Called for Post -SP check up: left knee genicular nerve blocks with fluoroscopic guidance     Change in pain: yes    Concerns? No    Confirmed [facility] appt? Yes

## 2024-02-28 ENCOUNTER — APPOINTMENT (OUTPATIENT)
Dept: RADIOLOGY | Facility: REHABILITATION | Age: 69
End: 2024-02-28
Attending: PHYSICAL MEDICINE & REHABILITATION
Payer: MEDICARE

## 2024-02-28 ENCOUNTER — HOSPITAL ENCOUNTER (OUTPATIENT)
Facility: REHABILITATION | Age: 69
End: 2024-02-28
Attending: PHYSICAL MEDICINE & REHABILITATION | Admitting: PHYSICAL MEDICINE & REHABILITATION
Payer: MEDICARE

## 2024-02-28 VITALS
HEART RATE: 75 BPM | DIASTOLIC BLOOD PRESSURE: 92 MMHG | HEIGHT: 70 IN | WEIGHT: 199.3 LBS | OXYGEN SATURATION: 94 % | RESPIRATION RATE: 17 BRPM | TEMPERATURE: 98.1 F | SYSTOLIC BLOOD PRESSURE: 164 MMHG | BODY MASS INDEX: 28.53 KG/M2

## 2024-02-28 PROCEDURE — 700111 HCHG RX REV CODE 636 W/ 250 OVERRIDE (IP): Mod: JZ

## 2024-02-28 PROCEDURE — 99152 MOD SED SAME PHYS/QHP 5/>YRS: CPT

## 2024-02-28 PROCEDURE — 64624 DSTRJ NULYT AGT GNCLR NRV: CPT

## 2024-02-28 RX ORDER — LIDOCAINE HYDROCHLORIDE 10 MG/ML
INJECTION, SOLUTION EPIDURAL; INFILTRATION; INTRACAUDAL; PERINEURAL
Status: COMPLETED
Start: 2024-02-28 | End: 2024-02-28

## 2024-02-28 RX ORDER — ROPIVACAINE HYDROCHLORIDE 5 MG/ML
INJECTION, SOLUTION EPIDURAL; INFILTRATION; PERINEURAL
Status: COMPLETED
Start: 2024-02-28 | End: 2024-02-28

## 2024-02-28 RX ORDER — MIDAZOLAM HYDROCHLORIDE 1 MG/ML
INJECTION INTRAMUSCULAR; INTRAVENOUS
Status: COMPLETED
Start: 2024-02-28 | End: 2024-02-28

## 2024-02-28 RX ADMIN — MIDAZOLAM HYDROCHLORIDE 1 MG: 1 INJECTION, SOLUTION INTRAMUSCULAR; INTRAVENOUS at 08:36

## 2024-02-28 RX ADMIN — LIDOCAINE HYDROCHLORIDE 30 ML: 10 INJECTION, SOLUTION EPIDURAL; INFILTRATION; INTRACAUDAL; PERINEURAL at 08:42

## 2024-02-28 RX ADMIN — FENTANYL CITRATE 50 MCG: 50 INJECTION, SOLUTION INTRAMUSCULAR; INTRAVENOUS at 08:36

## 2024-02-28 RX ADMIN — ROPIVACAINE HYDROCHLORIDE 10 ML: 5 INJECTION, SOLUTION EPIDURAL; INFILTRATION; PERINEURAL at 08:43

## 2024-02-28 ASSESSMENT — PAIN DESCRIPTION - PAIN TYPE: TYPE: CHRONIC PAIN

## 2024-02-28 ASSESSMENT — FIBROSIS 4 INDEX: FIB4 SCORE: 1.19

## 2024-02-28 NOTE — OP REPORT
Date of Service: 2/28/2024    Physician/s: Raymundo Rolle MD    Pre-operative Diagnosis: Knee osteoarthrosis, Knee pain,     Post-operative Diagnosis: Knee osteoarthrosis, Knee pain,     Procedure: LEFT Knee Genicular Nerves radiofrequency neurotomy    Description of procedure:  The risks, benefits, and alternatives of the procedure were reviewed and discussed with the patient.  Written informed consent was freely obtained. A pre-procedural time-out was conducted by the physician verifying patient’s identity, procedure to be performed, procedure site and side, and allergy verification. Appropriate equipment was determined to be in place for the procedure.     Moderation sedation was achieved with Versed (1mg) and Fentanyl (50mcg). Monitoring of the patients vital signs and respiratory status was provided by trained independent registered nurse during the entire course of the procedures and under my supervision and recoded in the patient’s medical record. The duration of sedation was over 10 minutes.     In the fluoroscopy suite the patient was placed in a supine position, the knee was flexed with a pillow/towels underneath for support, and the skin was prepped and draped in the usual sterile fashion. The fluoroscope was placed over the LEFT knee at an true AP position, and superolateral genicular nerve,  superomedial genicular nerve, inferomedial genicular nerve targets for injection were marked. A 27g needle was placed into each of the markings at the genicular nerve targets, and approx 2mL of 1% Lidocaine was injected subcutaneously into the epidermal and dermal layers. The needle was removed. A 18g RF needle was then advanced into the superior lateral, superior medial, and superior midline target points for the genicular nerves. The needle tips were then verified by AP and lateral views.  Following negative aspiration, 1mL of 1% Lidocaine and 1 mL of 0.5% ropivacaine   was then injected at the above levels. The  RF probes were placed into each of the needles, motor stimulation was done which was negative, and the RF probe was applied to each of the nerves for 1 minutes and 30 seconds at 80 degrees celsius. The needles were removed intact after restyleted.      A 18g RF needle was then advanced to the second target for the same genicular nerves. The needle tips were then verified by AP and lateral views. In the AP view. The RF probes were placed into each of the needles, motor stimulation was done which was negative, and the RF probe was applied to each of the nerves for 1 minutes and 30 seconds at 80 degrees celsius. The needles were removed intact after restyleted.       There were no complications noted, was hemodynamically stable, and tolerated the procedure well.     Raymundo Rolle MD  Interventional Spine and Pain  Physical Medicine and Rehabilitation  [facility]          CPT  Genicular nerve RFA:  [id number]  Superomedial genicular nerve    Superolateral genicular nerve    Inferomedial genicular nerve   moderate procedural sedation first 15 minutes: [id number]

## 2024-02-28 NOTE — INTERVAL H&P NOTE
H&P reviewed. The patient was examined and there are no changes to the H&P     Lungs clear to auscultation bilaterally.  No abdominal tenderness.  Heart regular rate and rhythm.  Vitals reviewed.    Proceed with the originally scheduled procedure.  The risks, benefits and alternatives were discussed.  The patient understands these.    Pre-Op Diagnosis Codes:     * Bilateral chronic knee pain [M25.561, M25.562, G89.29]     * Arthritis of knee [M17.10]  Procedure(s):  LEFT genicular nerve neurotomy with sedation. plan on sedation 1mg versed and 50mcg fentanyl.      The patient had 90% pain relief during the diagnostic phase after genicular nerve block with preprocedure pain 6/10 NRS and postprocedure pain 1/10 NRS.    Raymundo Rolle MD  Physical Medicine and Rehabilitation  Interventional Spine and Sports Physiatry  [facility]

## 2024-02-28 NOTE — OR SURGEON
Immediate Post OP Note    Pre-Op Diagnosis Codes:     * Bilateral chronic knee pain [M25.561, M25.562, G89.29]     * Arthritis of knee [M17.10]      Post-Op Diagnosis Codes:     * Bilateral chronic knee pain [M25.561, M25.562, G89.29]     * Arthritis of knee [M17.10]      Procedure(s):  LEFT genicular nerve neurotomy with sedation.     sedation 1mg versed and 50mcg fentanyl.    - Wound Class: Clean    Surgeon(s):  Raymundo Rolle M.D.    Anesthesiologist/Type of Anesthesia:  No anesthesia staff entered./Sedation    Surgical Staff:  Circulator: Diane Trevino R.N.  Scrub Person: Sheba Panda R.N.  Radiology Technologist: Ab Nicolas    Specimens removed if any:  * No specimens in log *    Estimated Blood Loss: None    Findings: None    Complications: None        2/28/2024 8:55 AM Raymundo Rolle M.D.

## 2024-02-29 ENCOUNTER — TELEPHONE (OUTPATIENT)
Dept: PHYSICAL MEDICINE AND REHAB | Facility: MEDICAL CENTER | Age: 69
End: 2024-02-29
Payer: MEDICARE

## 2024-02-29 NOTE — TELEPHONE ENCOUNTER
Called for Post -SP check up: LEFT genicular nerve neurotomy with sedation      Change in pain:    Concerns?    Confirmed [facility] appt?

## 2024-03-27 ENCOUNTER — TELEPHONE (OUTPATIENT)
Dept: HEALTH INFORMATION MANAGEMENT | Facility: OTHER | Age: 69
End: 2024-03-27
Payer: MEDICARE

## 2024-04-18 ENCOUNTER — APPOINTMENT (OUTPATIENT)
Dept: PHYSICAL MEDICINE AND REHAB | Facility: MEDICAL CENTER | Age: 69
End: 2024-04-18
Payer: MEDICARE

## 2024-05-18 ENCOUNTER — HOSPITAL ENCOUNTER (OUTPATIENT)
Dept: LAB | Facility: MEDICAL CENTER | Age: 69
End: 2024-05-18
Attending: FAMILY MEDICINE
Payer: MEDICARE

## 2024-05-18 DIAGNOSIS — D72.10 EOSINOPHILIA, UNSPECIFIED TYPE: ICD-10-CM

## 2024-05-18 DIAGNOSIS — E78.2 MIXED HYPERLIPIDEMIA: ICD-10-CM

## 2024-05-18 DIAGNOSIS — R73.03 PREDIABETES: ICD-10-CM

## 2024-05-18 LAB
BASOPHILS # BLD AUTO: 0.9 % (ref 0–1.8)
BASOPHILS # BLD: 0.09 K/UL (ref 0–0.12)
CHOLEST SERPL-MCNC: 272 MG/DL (ref 100–199)
EOSINOPHIL # BLD AUTO: 1.13 K/UL (ref 0–0.51)
EOSINOPHIL NFR BLD: 11.6 % (ref 0–6.9)
ERYTHROCYTE [DISTWIDTH] IN BLOOD BY AUTOMATED COUNT: 44.9 FL (ref 35.9–50)
EST. AVERAGE GLUCOSE BLD GHB EST-MCNC: 120 MG/DL
FASTING STATUS PATIENT QL REPORTED: NORMAL
HBA1C MFR BLD: 5.8 % (ref 4–5.6)
HCT VFR BLD AUTO: 48.2 % (ref 42–52)
HDLC SERPL-MCNC: 46 MG/DL
HGB BLD-MCNC: 16.4 G/DL (ref 14–18)
IMM GRANULOCYTES # BLD AUTO: 0.05 K/UL (ref 0–0.11)
IMM GRANULOCYTES NFR BLD AUTO: 0.5 % (ref 0–0.9)
LDLC SERPL CALC-MCNC: 173 MG/DL
LYMPHOCYTES # BLD AUTO: 2.01 K/UL (ref 1–4.8)
LYMPHOCYTES NFR BLD: 20.6 % (ref 22–41)
MCH RBC QN AUTO: 31.6 PG (ref 27–33)
MCHC RBC AUTO-ENTMCNC: 34 G/DL (ref 32.3–36.5)
MCV RBC AUTO: 92.9 FL (ref 81.4–97.8)
MONOCYTES # BLD AUTO: 0.93 K/UL (ref 0–0.85)
MONOCYTES NFR BLD AUTO: 9.5 % (ref 0–13.4)
NEUTROPHILS # BLD AUTO: 5.57 K/UL (ref 1.82–7.42)
NEUTROPHILS NFR BLD: 56.9 % (ref 44–72)
NRBC # BLD AUTO: 0 K/UL
NRBC BLD-RTO: 0 /100 WBC (ref 0–0.2)
PLATELET # BLD AUTO: 210 K/UL (ref 164–446)
PMV BLD AUTO: 11.2 FL (ref 9–12.9)
RBC # BLD AUTO: 5.19 M/UL (ref 4.7–6.1)
T4 FREE SERPL-MCNC: 0.89 NG/DL (ref 0.93–1.7)
TRIGL SERPL-MCNC: 263 MG/DL (ref 0–149)
TSH SERPL DL<=0.005 MIU/L-ACNC: 4.25 UIU/ML (ref 0.38–5.33)
WBC # BLD AUTO: 9.8 K/UL (ref 4.8–10.8)

## 2024-05-21 ENCOUNTER — OFFICE VISIT (OUTPATIENT)
Dept: MEDICAL GROUP | Facility: PHYSICIAN GROUP | Age: 69
End: 2024-05-21
Payer: MEDICARE

## 2024-05-21 VITALS
RESPIRATION RATE: 14 BRPM | HEIGHT: 70 IN | HEART RATE: 79 BPM | BODY MASS INDEX: 28.06 KG/M2 | OXYGEN SATURATION: 96 % | TEMPERATURE: 98 F | SYSTOLIC BLOOD PRESSURE: 128 MMHG | DIASTOLIC BLOOD PRESSURE: 82 MMHG | WEIGHT: 196 LBS

## 2024-05-21 DIAGNOSIS — E78.00 HYPERCHOLESTEROLEMIA: ICD-10-CM

## 2024-05-21 DIAGNOSIS — Z12.2 ENCOUNTER FOR SCREENING FOR LUNG CANCER: ICD-10-CM

## 2024-05-21 DIAGNOSIS — Z13.6 ENCOUNTER FOR ABDOMINAL AORTIC ANEURYSM (AAA) SCREENING: ICD-10-CM

## 2024-05-21 DIAGNOSIS — F17.210 CIGARETTE NICOTINE DEPENDENCE WITHOUT COMPLICATION: ICD-10-CM

## 2024-05-21 PROCEDURE — 3079F DIAST BP 80-89 MM HG: CPT | Performed by: FAMILY MEDICINE

## 2024-05-21 PROCEDURE — 99214 OFFICE O/P EST MOD 30 MIN: CPT | Performed by: FAMILY MEDICINE

## 2024-05-21 PROCEDURE — 3074F SYST BP LT 130 MM HG: CPT | Performed by: FAMILY MEDICINE

## 2024-05-21 ASSESSMENT — FIBROSIS 4 INDEX: FIB4 SCORE: 1.22

## 2024-05-21 NOTE — PROGRESS NOTES
"CHIEF COMPLAINT / REASON FOR VISIT  Alberto Bowles is a 69 y.o. male that presents today for   Chief Complaint   Patient presents with    Follow-Up     HISTORY OF PRESENT ILLNESS  Has upcoming visit with orthopedics for evaluation of knee pain, secondary to osteoarthritis. Had genicular nerve radiofrequency neurotomy    Had gum pain and swelling with amlodipine so he stopped taking this.    Currently smoking approximately 1/2 ppd. Not interested in quitting    OBJECTIVE   /82 (BP Location: Right arm, Patient Position: Sitting, BP Cuff Size: Adult)   Pulse 79   Temp 36.7 °C (98 °F) (Temporal)   Resp 14   Ht 1.778 m (5' 10\")   Wt 88.9 kg (196 lb)   SpO2 96%   BMI 28.12 kg/m²      PHYSICAL EXAM  Constitutional: Sitting comfortably, in no acute distress, responds to questions appropriately.  Skin: Warm and dry, no rashes or lesions on face or exposed upper extremities    ASSESSMENT & PLAN  1. Hypercholesterolemia  Chronic, uncontrolled untreated.  Previously did not tolerate multiple statins including atorvastatin.  His insurance is not covering ezetimibe-bempedoic acid due to lack of records regarding his statin intolerance.  Will obtain records from his previous primary care provider.    2. Cigarette nicotine dependence without complication  Not interested in quitting at this time.  Candidate for lung cancer screening and AAA screening.  - REFERRAL TO LUNG CANCER SCREENING PROGRAM  - US-ABDOMINAL AORTA W/O DOPPLER; Future    3. Encounter for screening for lung cancer  - REFERRAL TO LUNG CANCER SCREENING PROGRAM    4. Encounter for abdominal aortic aneurysm (AAA) screening  - US-ABDOMINAL AORTA W/O DOPPLER; Future      "

## 2024-05-29 ENCOUNTER — TELEPHONE (OUTPATIENT)
Dept: HEALTH INFORMATION MANAGEMENT | Facility: OTHER | Age: 69
End: 2024-05-29
Payer: MEDICARE

## 2024-05-29 NOTE — TELEPHONE ENCOUNTER
Outcome: Left Message    Please transfer to Patient Outreach Team at [phone] when patient returns call.

## 2024-05-31 NOTE — TELEPHONE ENCOUNTER
Outcome: Left Message    Please transfer to Patient Outreach Team at [phone] when patient returns call.      Attempt # 2

## 2024-06-07 NOTE — TELEPHONE ENCOUNTER
Age 50-77 yrs of age? 69    Total Years Smoking cigarettes? 50    20 pack year hx of smoking, or greater (average packs per day)?  .5 pk/ 50 yrs= 25 yr     Current smoker or if quit, has pt quit within last 15 yrs? Current    Completed Lung Cancer screen CT with [facility] previously? No     Anything noted on previous CT involving lungs? ([place], [place], Etc.) No    Any signs or symptoms of lung cancer? (New / change to Cough, Wheezing, S.O.B., coughing up blood, unexplained weight loss within last year)?  No     Previous history of lung cancer? No
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
## 2024-06-17 ENCOUNTER — OFFICE VISIT (OUTPATIENT)
Dept: SLEEP MEDICINE | Facility: MEDICAL CENTER | Age: 69
End: 2024-06-17
Attending: FAMILY MEDICINE
Payer: MEDICARE

## 2024-06-17 VITALS
BODY MASS INDEX: 28.06 KG/M2 | RESPIRATION RATE: 18 BRPM | HEART RATE: 82 BPM | OXYGEN SATURATION: 96 % | SYSTOLIC BLOOD PRESSURE: 150 MMHG | HEIGHT: 70 IN | DIASTOLIC BLOOD PRESSURE: 88 MMHG | WEIGHT: 196 LBS

## 2024-06-17 DIAGNOSIS — F17.210 CIGARETTE SMOKER: ICD-10-CM

## 2024-06-17 PROCEDURE — 3077F SYST BP >= 140 MM HG: CPT | Performed by: FAMILY MEDICINE

## 2024-06-17 PROCEDURE — G0296 VISIT TO DETERM LDCT ELIG: HCPCS | Performed by: FAMILY MEDICINE

## 2024-06-17 PROCEDURE — 3079F DIAST BP 80-89 MM HG: CPT | Performed by: FAMILY MEDICINE

## 2024-06-17 ASSESSMENT — ENCOUNTER SYMPTOMS
PALPITATIONS: 0
SHORTNESS OF BREATH: 0
CHILLS: 0
FEVER: 0
COUGH: 0
HEMOPTYSIS: 0
SPUTUM PRODUCTION: 0
WHEEZING: 0
WEIGHT LOSS: 0

## 2024-06-17 ASSESSMENT — FIBROSIS 4 INDEX: FIB4 SCORE: 1.22

## 2024-06-17 NOTE — Clinical Note
Pt arrived to ED with parents and grandmother for upper airway congestion and concern for pt developing pneumonia.  Pt is unable to cough up secretions on her own.  No other symptoms.  She is being treated for a UTI.  On abx.  Mother states pt has been acting more tired lately.          LOC awake and alert, cooperative, calm affect, recognizes caregiver, responds appropriately for age  APPEARANCE resting comfortably in no acute distress. Pt has clean skin, nails, and clothes.   HEENT Head appears normal in size and shape,  Eyes appear normal w/o drainage, Ears appear normal w/o drainage, nose appears normal w/o drainage/mucus, Throat and neck appear normal w/o drainage/redness  NEURO eyes open spontaneously, responses appropriate, pupils equal in size,  RESPIRATORY airway open and patent, respirations of regular rate and rhythm, nonlabored, no respiratory distress observed, bilateral lung sounds clear, upper airway congestion,   MUSCULOSKELETAL moves all extremities at baseline, no obvious deformities  SKIN normal color for ethnicity, warm, dry, with normal turgor, moist mucous membranes, no bruising or breakdown observed  ABDOMEN soft, non tender, non distended, no guarding, regular bowel movements  GENITOURINARY voiding well, no difficulty starting a stream, denies pain, burning, itching     Thank you for referring Jesus Alberto to the Lung Cancer Screening program.  I enrolled him today. I will update you re: abnormal findings. -Dr. Shantel Butterfield

## 2024-06-26 ENCOUNTER — HOSPITAL ENCOUNTER (OUTPATIENT)
Dept: RADIOLOGY | Facility: MEDICAL CENTER | Age: 69
End: 2024-06-26
Attending: FAMILY MEDICINE
Payer: MEDICARE

## 2024-06-26 DIAGNOSIS — F17.210 CIGARETTE SMOKER: ICD-10-CM

## 2024-06-26 PROCEDURE — 71271 CT THORAX LUNG CANCER SCR C-: CPT

## 2024-07-08 ENCOUNTER — TELEPHONE (OUTPATIENT)
Dept: SLEEP MEDICINE | Facility: MEDICAL CENTER | Age: 69
End: 2024-07-08
Payer: MEDICARE

## 2024-07-24 ENCOUNTER — APPOINTMENT (OUTPATIENT)
Dept: RADIOLOGY | Facility: MEDICAL CENTER | Age: 69
End: 2024-07-24
Attending: FAMILY MEDICINE
Payer: MEDICARE

## 2024-08-22 ENCOUNTER — HOSPITAL ENCOUNTER (OUTPATIENT)
Dept: RADIOLOGY | Facility: MEDICAL CENTER | Age: 69
End: 2024-08-22
Attending: FAMILY MEDICINE
Payer: MEDICARE

## 2024-08-22 DIAGNOSIS — F17.210 CIGARETTE NICOTINE DEPENDENCE WITHOUT COMPLICATION: ICD-10-CM

## 2024-08-22 DIAGNOSIS — Z13.6 ENCOUNTER FOR ABDOMINAL AORTIC ANEURYSM (AAA) SCREENING: ICD-10-CM

## 2024-08-22 PROCEDURE — 76706 US ABDL AORTA SCREEN AAA: CPT

## 2024-12-11 PROBLEM — M17.9 OSTEOARTHRITIS, KNEE: Status: ACTIVE | Noted: 2024-12-11

## 2024-12-12 PROBLEM — M17.12 PRIMARY OSTEOARTHRITIS OF LEFT KNEE: Status: ACTIVE | Noted: 2024-12-11

## 2024-12-17 ENCOUNTER — APPOINTMENT (OUTPATIENT)
Dept: ADMISSIONS | Facility: MEDICAL CENTER | Age: 69
End: 2024-12-17
Attending: STUDENT IN AN ORGANIZED HEALTH CARE EDUCATION/TRAINING PROGRAM
Payer: MEDICARE

## 2024-12-20 ENCOUNTER — PRE-ADMISSION TESTING (OUTPATIENT)
Dept: ADMISSIONS | Facility: MEDICAL CENTER | Age: 69
End: 2024-12-20
Attending: STUDENT IN AN ORGANIZED HEALTH CARE EDUCATION/TRAINING PROGRAM
Payer: MEDICARE

## 2024-12-20 VITALS — HEIGHT: 70 IN | WEIGHT: 193 LBS | BODY MASS INDEX: 27.63 KG/M2

## 2024-12-20 DIAGNOSIS — Z01.812 PRE-OPERATIVE LABORATORY EXAMINATION: ICD-10-CM

## 2024-12-20 DIAGNOSIS — Z01.810 PRE-OPERATIVE CARDIOVASCULAR EXAMINATION: ICD-10-CM

## 2024-12-20 LAB
ANION GAP SERPL CALC-SCNC: 10 MMOL/L (ref 7–16)
BUN SERPL-MCNC: 10 MG/DL (ref 8–22)
CALCIUM SERPL-MCNC: 9.1 MG/DL (ref 8.4–10.2)
CHLORIDE SERPL-SCNC: 103 MMOL/L (ref 96–112)
CO2 SERPL-SCNC: 25 MMOL/L (ref 20–33)
CREAT SERPL-MCNC: 0.81 MG/DL (ref 0.5–1.4)
EKG IMPRESSION: NORMAL
ERYTHROCYTE [DISTWIDTH] IN BLOOD BY AUTOMATED COUNT: 45.1 FL (ref 35.9–50)
GFR SERPLBLD CREATININE-BSD FMLA CKD-EPI: 96 ML/MIN/1.73 M 2
GLUCOSE SERPL-MCNC: 103 MG/DL (ref 65–99)
HCT VFR BLD AUTO: 48.9 % (ref 42–52)
HGB BLD-MCNC: 16.7 G/DL (ref 14–18)
MCH RBC QN AUTO: 32 PG (ref 27–33)
MCHC RBC AUTO-ENTMCNC: 34.2 G/DL (ref 32.3–36.5)
MCV RBC AUTO: 93.7 FL (ref 81.4–97.8)
PLATELET # BLD AUTO: 209 K/UL (ref 164–446)
PMV BLD AUTO: 11.2 FL (ref 9–12.9)
POTASSIUM SERPL-SCNC: 4.1 MMOL/L (ref 3.6–5.5)
RBC # BLD AUTO: 5.22 M/UL (ref 4.7–6.1)
SCCMEC + MECA PNL NOSE NAA+PROBE: NEGATIVE
SCCMEC + MECA PNL NOSE NAA+PROBE: NEGATIVE
SODIUM SERPL-SCNC: 138 MMOL/L (ref 135–145)
WBC # BLD AUTO: 10 K/UL (ref 4.8–10.8)

## 2024-12-20 PROCEDURE — 36415 COLL VENOUS BLD VENIPUNCTURE: CPT

## 2024-12-20 PROCEDURE — 85027 COMPLETE CBC AUTOMATED: CPT

## 2024-12-20 PROCEDURE — 87641 MR-STAPH DNA AMP PROBE: CPT

## 2024-12-20 PROCEDURE — 80048 BASIC METABOLIC PNL TOTAL CA: CPT

## 2024-12-20 PROCEDURE — 87640 STAPH A DNA AMP PROBE: CPT | Mod: XU

## 2024-12-20 PROCEDURE — 93005 ELECTROCARDIOGRAM TRACING: CPT | Mod: TC

## 2024-12-20 PROCEDURE — 93010 ELECTROCARDIOGRAM REPORT: CPT | Performed by: INTERNAL MEDICINE

## 2024-12-20 ASSESSMENT — FIBROSIS 4 INDEX: FIB4 SCORE: 1.22

## 2024-12-20 NOTE — OR NURSING
DISCHARGE PLANNING NOTE - TOTAL JOINT    Procedure: Procedure(s):  ROBOTIC LEFT TOTAL KNEE ARTHROPLASTY  Procedure Date: 1/16/2025  Insurance: Payor: [facility] SENIOR CARE PLUS / Plan: [facility] PREFERRED  / Product Type: Medicare Advantage /    Equipment currently available at home?   NONE  Steps into the home? 2  Steps within the home? 0  Toilet height? Standard  Type of shower? tub-shower  Home Oxygen? NA  Portable tank?  NA  Oxygen Provider:NA  Is Outpatient Physical Therapy set up after surgery? No   Did you take the Total Joint Class and where? Yes, received [facility] book.  Who will be your transportation home on day of discharge? Breanne-wife    Have you made arrangements to have someone stay with you at home for the first 3 days following discharge, and if so, whom? Breanne-wife    Have you notified your surgeon that you do not have transportation or someone to help you after discharge? NA    Are you planning on going to a transitional care facility, for example a skilled nursing facility, post operatively for rehab, and if so, have you contacted your insurance plan to see if they cover this? NA    This writer met with pt. Pt received copy of Home Safety Checklist and the Equipment Resource Guide. Expected process in Recovery Room and dc criteria discussed with pt. All questions answered and verbalizes understanding of all instructions. No dc needs identified at this time. Anticipate dc to home without barriers.

## 2024-12-20 NOTE — PREPROCEDURE INSTRUCTIONS
Pre-admit appointment completed. Reviewed the Preparing for your procedure handout with patient. Patient instructed per anesthesia/pharmacy guidelines regarding taking, holding, or contacting provider for instructions on regularly prescribed medications before surgery.     Denies anesthesia complications

## 2024-12-27 ENCOUNTER — HOSPITAL ENCOUNTER (OUTPATIENT)
Dept: RADIOLOGY | Facility: MEDICAL CENTER | Age: 69
End: 2024-12-27
Attending: STUDENT IN AN ORGANIZED HEALTH CARE EDUCATION/TRAINING PROGRAM
Payer: MEDICARE

## 2024-12-27 DIAGNOSIS — M17.12 LOCALIZED OSTEOARTHRITIS OF LEFT KNEE: ICD-10-CM

## 2024-12-27 PROCEDURE — 73700 CT LOWER EXTREMITY W/O DYE: CPT | Mod: LT

## 2025-01-08 NOTE — H&P (VIEW-ONLY)
Subjective     Patient ID:  Jesus Alberto Bowles is a 69 y.o. male.    Chief Complaint   Patient presents with    Left Knee - Pre-op Exam          Alberto Bowles is a 69 y.o. male who presents today with chronic and progressive problems with the left knee(s) over the past 1 years.  An inciting event did not occur.  Primarily complains of medial pain, Stiffness, and Tenderness . Patient has worsened symptoms with activity, walking. The pain is significantly interfering activities which include enjoying hobbies and walking.  Patient has tried the following Rest, Ice, Activity modification, Over the counter analgesics (Tylenol and/or Ibuprofen and/or Topicals), Prescription NSAIDs, Physical therapy, Corticosteroid injections (1/23/2024), and Gel injections ( ). Patient is not using any ambulatory assistive devices.     Patient returns today for preoperative visit.  Reports that his symptoms have gotten worse since the last time.  Continues to use nonoperative treatment previously prescribed with no relief.  He had some questions regarding surgery which were answered in detail.     Reports that he has significantly cut down smoking and does not plan to continue it during the perioperative period.    Pain Assessment  Pain Assessment: 0-10  Pain Score: 8                             Review of Systems  Otherwise negative except as per HPI.    Past Medical History  Past Medical History:   Diagnosis Date    Arthritis 2000    Cancer (HCC) 2009    larynx    Cataract 2024    Chronic back pain     Cigarette nicotine dependence     Hiatus hernia syndrome 2015    High cholesterol 2000    Hypertension     Indigestion        Past Surgical History  Past Surgical History:   Procedure Laterality Date    NE FLUOROSCOPIC GUIDANCE NEEDLE PLACEMENT Left 02/28/2024    Procedure: LEFT genicular nerve neurotomy with sedation. plan on sedation 1mg versed and 50mcg fentanyl. this is to be done after the genicular nerve block if this is a  positive block;  Surgeon: Raymundo Rolle M.D.;  [facility];  Service: Pain Management    AK INJ AA/STRD GNCLR NRV BRNCH W/IMG Left 02/13/2024    Procedure: LEFT knee genicular nerve blocks with fluoroscopic guidance;  Surgeon: Raymundo Rolle M.D.;  Location[facility];  Service: Pain Management    APPENDECTOMY      OTHER  2009    larynx    TONSILLECTOMY Bilateral        Allergies  Allergies   Allergen Reactions    Ibuprofen Swelling     Facial swelling        Home Medications    Current Outpatient Medications:     aspirin (ASPIRIN 81) 81 MG EC tablet, Take 1 Tablet by mouth 2 times a day for 30 days., Disp: 60 Tablet, Rfl: 0    docusate sodium (COLACE) 100 MG Cap, Take 1 Capsule by mouth 2 times a day as needed for Constipation (2-3 times daily PRN)., Disp: 30 Capsule, Rfl: 0    meloxicam (MOBIC) 7.5 MG Tab, Take 1 Tablet by mouth 2 times a day., Disp: 60 Tablet, Rfl: 1    ondansetron (ZOFRAN ODT) 4 MG TABLET DISPERSIBLE, Take 1 Tablet by mouth every 6 hours as needed for Nausea/Vomiting., Disp: 30 Tablet, Rfl: 0    Naloxone (NARCAN) 4 MG/0.1ML Liquid, Spray one actuation in one nostril for overdose. Call 911. May repeat in 2 minutes if needed., Disp: 2 Each, Rfl: 0    acetaminophen (TYLENOL) 500 MG Tab, Take 2 Tablets by mouth every 8 hours., Disp: 60 Tablet, Rfl: 0    HYDROcodone-acetaminophen (NORCO) 5-325 MG Tab per tablet, Take 1 Tablet by mouth every four hours as needed (TAKE 1 TABLET EVERY 4 HOURS FOR MODERATE TO SEVERE PAIN) for up to 7 days., Disp: 40 Tablet, Rfl: 0    cyclobenzaprine (FLEXERIL) 10 mg Tab, Take 1 Tablet by mouth 3 times a day as needed for Muscle Spasms., Disp: 30 Tablet, Rfl: 0     Family History  Family History   Problem Relation Age of Onset    Squamous cell carcinoma Mother     Hypertension Father     Melanoma Sister     Hypertension Brother         Social History  Social History     Socioeconomic History    Marital status:      Highest education level: 12th grade   Tobacco Use    Smoking status: Every Day     Current packs/day: 0.50     Average packs/day: 0.7 packs/day for 75.4 years (50.0 ttl pk-yrs)     Types: Cigarettes     Start date: 5/1/1974    Smokeless tobacco: Never   Vaping Use    Vaping status: Never Used   Substance and Sexual Activity    Alcohol use: Yes     Alcohol/week: 8.4 oz     Types: 14 Cans of beer per week    Drug use: Never    Sexual activity: Yes     Social Drivers of Health     Financial Resource Strain: Low Risk  (1/8/2024)    Overall Financial Resource Strain (CARDIA)     Difficulty of Paying Living Expenses: Not hard at all   Food Insecurity: No Food Insecurity (1/8/2024)    Hunger Vital Sign     Worried About Running Out of Food in the Last Year: Never true     Ran Out of Food in the Last Year: Never true   Transportation Needs: No Transportation Needs (1/8/2024)    PRAPARE - Transportation     Lack of Transportation (Medical): No     Lack of Transportation (Non-Medical): No   Physical Activity: Inactive (1/8/2024)    Exercise Vital Sign     Days of Exercise per Week: 0 days     Minutes of Exercise per Session: 0 min   Stress: No Stress Concern Present (1/8/2024)    [employer] - Occupational Stress Questionnaire     Feeling of Stress : Not at all   Social Connections: Moderately Isolated (1/8/2024)    Social Connection and Isolation Panel [NHANES]     Frequency of Communication with Friends and Family: More than three times a week     Frequency of Social Gatherings with Friends and Family: Patient declined     Attends Gnosticism Services: Never     Active Member of Clubs or Organizations: No     Attends Club or Organization Meetings: Never     Marital Status:    Housing Stability: Unknown (1/8/2024)    Housing Stability Vital Sign     Unable to Pay for Housing in the Last Year: No     Unstable Housing in the Last Year: No     Social History     Tobacco Use    Smoking  status: Every Day     Current packs/day: 0.50     Average packs/day: 0.7 packs/day for 75.4 years (50.0 ttl pk-yrs)     Types: Cigarettes     Start date: 1974    Smokeless tobacco: Never   Substance Use Topics    Alcohol use: Yes     Alcohol/week: 8.4 oz     Types: 14 Cans of beer per week              Objective     Vitals  There were no vitals taken for this visit.     There is no height or weight on file to calculate BMI.    Exam    General: Alert and oriented x3, no apparent distress  Lungs: Breathing is nonlabored  Cardiac: All 4 extremities are warm and well perfused    Knee exam:   Ortho Exam  Left   Normal Alignment, Range of motion 0 to 110, Extension la degrees, Pain with ROM, No effusion present, Tender to palpation at the medial joint line, No pain with patella compression, Stable to Anterior/Posterior and Varus/vagus tress, Hip exam: Flexion to 100+, full extension, internal/external rotation adequate and no pain with logroll, and Neurovascular status: Sensation intact and moves foot and ankle up and down. Palpable PT pulse. Patient is able to rise from a seated position move to the exam table and walks with a antalgic gait.        Data:     Diagnostic tests reviewed for today's visit:     All labs (last 1 month):   No visits with results within 1 Month(s) from this visit.   Latest known visit with results is:   Hospital Outpatient Visit on 2024   Component Date Value Ref Range Status    Cholesterol,Tot 2024 272 (H)  100 - 199 mg/dL Final    Triglycerides 2024 263 (H)  0 - 149 mg/dL Final    HDL 2024 46  >=40 mg/dL Final    LDL 2024 173 (H)  <100 mg/dL Final    Glycohemoglobin 2024 5.8 (H)  4.0 - 5.6 % Final    Comment: Increased risk for diabetes:  5.7 -6.4%  Diabetes:  >6.4%  Glycemic control for adults with diabetes:  <7.0%    The above interpretations are per ADA guidelines.  Diagnosis  of diabetes mellitus on the basis of elevated Hemoglobin A1c  should be  confirmed by repeating the Hb A1c test.      Est Avg Glucose 05/18/2024 120  mg/dL Final    Comment: The eAG calculation is based on the A1c-Derived Daily Glucose  (ADAG) study.  See the ADA's website for additional information.      WBC 05/18/2024 9.8  4.8 - 10.8 K/uL Final    RBC 05/18/2024 5.19  4.70 - 6.10 M/uL Final    Hemoglobin 05/18/2024 16.4  14.0 - 18.0 g/dL Final    Hematocrit 05/18/2024 48.2  42.0 - 52.0 % Final    MCV 05/18/2024 92.9  81.4 - 97.8 fL Final    MCH 05/18/2024 31.6  27.0 - 33.0 pg Final    MCHC 05/18/2024 34.0  32.3 - 36.5 g/dL Final    Please note new reference range effective 05/22/2023.    RDW 05/18/2024 44.9  35.9 - 50.0 fL Final    Platelet Count 05/18/2024 210  164 - 446 K/uL Final    MPV 05/18/2024 11.2  9.0 - 12.9 fL Final    Neutrophils-Polys 05/18/2024 56.90  44.00 - 72.00 % Final    Lymphocytes 05/18/2024 20.60 (L)  22.00 - 41.00 % Final    Monocytes 05/18/2024 9.50  0.00 - 13.40 % Final    Eosinophils 05/18/2024 11.60 (H)  0.00 - 6.90 % Final    Basophils 05/18/2024 0.90  0.00 - 1.80 % Final    Immature Granulocytes 05/18/2024 0.50  0.00 - 0.90 % Final    Nucleated RBC 05/18/2024 0.00  0.00 - 0.20 /100 WBC Final    Please note new reference range effective 05/22/2023.    Neutrophils (Absolute) 05/18/2024 5.57  1.82 - 7.42 K/uL Final    Comment: Includes immature neutrophils, if present.  Please note new reference range effective 05/22/2023.      Lymphs (Absolute) 05/18/2024 2.01  1.00 - 4.80 K/uL Final    Monos (Absolute) 05/18/2024 0.93 (H)  0.00 - 0.85 K/uL Final    Eos (Absolute) 05/18/2024 1.13 (H)  0.00 - 0.51 K/uL Final    Baso (Absolute) 05/18/2024 0.09  0.00 - 0.12 K/uL Final    Immature Granulocytes (abs) 05/18/2024 0.05  0.00 - 0.11 K/uL Final    NRBC (Absolute) 05/18/2024 0.00  K/uL Final    TSH 05/18/2024 4.250  0.380 - 5.330 uIU/mL Final    Comment: The 2011 American Thyroid Association (LEW) guidelines  recommended that the interpretation of thyroid function  "in  pregnancy be based on trimester specific reference ranges.    1st Trimester  0.100-2.500 mIU/L  2nd Trimester  0.200-3.000 mIU/L  3rd Trimester  0.300-3.500 mIU/L    These established reference ranges have not been validated  at [facility] [place].      Free T-4 05/18/2024 0.89 (L)  0.93 - 1.70 ng/dL Final    Fasting Status 05/18/2024 Fasting   Final       BMP:   Lab Results   Component Value Date/Time    SODIUM 138 12/20/2024 09:34 AM    POTASSIUM 4.1 12/20/2024 09:34 AM    BUN 10 12/20/2024 09:34 AM    CREATININE 0.81 12/20/2024 09:34 AM       CBC:   Lab Results   Component Value Date/Time    WBC 10.0 12/20/2024 09:34 AM    HEMOGLOBIN 16.7 12/20/2024 09:34 AM    HEMATOCRIT 48.9 12/20/2024 09:34 AM       PT/INR: No results found for: \"PROTHROMBTM\", \"INR\"    A1c:   Lab Results   Component Value Date/Time    HBA1C 5.8 (H) 05/18/2024 0923    AVGLUC 120 05/18/2024 0923       ESR/CRP: No results found for: \"CREACTPROT\", \"SEDRATEWES\"    Imaging:    CT-KNEE W/O PLUS RECONS LEFT  Narrative: 12/27/2024 6:57 AM    HISTORY/REASON FOR EXAM:  LEFT KNEE CT WITH JAZMÍN PROTOCOL.  Left knee pain    TECHNIQUE/ EXAM DESCRIPTION AND NUMBER OF VIEWS:  CT scan of the LEFT knee without contrast, with reconstructions.    Noncontrast thin helical sections were obtained from the distal femur through the proximal tibia/fibula. Sagittal and coronal reconstructions were generated from the axial images.    Up to date radiation dose reduction adjustments have been utilized to meet ALARA standards for radiation dose reduction.    COMPARISON:  None.    FINDINGS:  Tricompartment degenerative changes of left knee with marked narrowing and sclerosis of the medial knee joint compartment  Question old lateral tibial plateau fracture.    Small knee joint effusion and moderate-sized popliteal cysts.  Intra-articular calcified loose bodies.    No acute fracture.    Arterial calcifications within the soft tissues.    Axial CT sections of the " left hip reveal mild degenerative changes.  Axial CT sections of the left ankle reveal degenerative changes.  Question old incompletely fused avulsion anterior margin of the lateral malleolus.  Impression: Tricompartment degenerative changes of the left knee and calcified intra-articular loose bodies.      Assessment & Plan     Impression    Left Knee Severe Degenerative Osteoarthritis,  Primary with  exacerbation. Discussed the patient's diagnosis and treatment options. Together we decided to proceed with the plan below.    Plan    Based on today's evaluation and discussions with Alberto Gandhi Wilbur, it is evident that he is experiencing significant, worsening knee pain. This pain increases with activity and weight-bearing, and it interferes with activities of daily living. These symptoms have persisted despite several non-surgical interventions, including a trial of oral pain medication, attempted physical therapy/structured exercise programs, and/or the use of assistive devices or bracing for at least 12 weeks (unless the patient was unable to tolerate these measures as discussed above). Given the severity of the condition, further physical therapy is unlikely to provide any benefit.    The patient's physical examination reveals limitations  in range of motion, pain with passive range of motion, crepitus, and effusion/synovitis. These findings are supported by imaging results showing  joint space narrowing, periarticular osteophyte formation, and subchondral sclerosis. The patient has been treated by our practice, and all reasonable treatments have failed to control the disease, which continues to cause significant pain and limit daily activities. The patient has failed conservative treatment, and after discussing options, both the provider and patient have agreed to proceed with joint replacement surgery.    Surgery: Left total knee arthroplasty as evidence by 6 months of unsuccessful non operative treatment  as outlined by the HPI  Metal Allergy: No  Planned Implants: [place]  Robot: Yes  DVT Prophylaxis: Aspirin 81mg PO BID for 4 weeks  Risk factors:   Smoking with increased risk of wound complications, infections and bone healing/formation. The patient was counseled extensively on smoking cessation. Their tobacco use was assessed and a discussion was had on methods to quit. We discussed that smoking increases risks of post-operative complications including wound healing and infection.    We also discussed extensively regarding the risk of periprosthetic joint infection.  The patient was advised that they must quit smoking prior to surgery to minimize risk. Patient overall understands that they would have to stop with their nicotine use 4 weeks prior to surgery as well as 4 wks following surgery.   The patient voiced understanding of this.   Clearances: n/a  Imaging: CT Left knee  Date Restrictions: n/a    The risks and benefits of surgery were discussed in detail, including but not limited to the risks of infection, bleeding, nerve or blood vessel injury, deep venous thrombosis, pulmonary embolism, arthrofibrosis, reflex sympathetic dystrophy, death, paralysis, knee or patella dislocation, extensor mechanism injury, bone fracture, and component loosening or failure, which could require re-operation or amputation. Informed consent will be obtained, and the patient will be scheduled for surgery. We also discussed fixation strategies, including cement and cementless options, as well as the advantages and disadvantages of each. Details of the surgery and rehabilitation were also covered.    All questions were answered, and the patient has chosen to proceed with surgery.        Based on today's evaluation and discussions with Alberto Bowles, it is evident that he is experiencing significant, worsening knee pain that increases with activity and weight-bearing, interfering with daily activities such as walking less  than two city blocks, bending, squatting, and twisting. These symptoms have persisted despite several non-surgical interventions, including trials of oral pain medication, physical therapy/ structured exercise programs, and the use of assistive devices or bracing for at least 12 weeks (unless the patient was unable to tolerate these measures as discussed above). Given the severity of the condition, further physical therapy is unlikely to provide any benefit.    The patient's physical examination reveals limitations in range of motion, pain with passive range of motion, crepitus, and effusion/synovitis. Imaging results support these findings, showing joint space narrowing, periarticular osteophyte formation, and subchondral sclerosis. All reasonable treatments provided by our practice have failed to control the disease, which continues to cause significant pain and limit daily activities. After discussing options extensively, the patient is interested in knee replacement surgery, which is considered the most predictable option for long-term pain relief and improved function.    We reviewed the risks, benefits, and alternatives to surgery in detail, including potential risks such as persistent pain, stiffness, wound problems, infection, instability, fracture, loosening or wearing of the parts, blood clots, deep venous thrombosis, pulmonary embolism, other medical problems, knee or patella dislocation, and component loosening or failure, which could necessitate re-operation or, in rare cases, amputation. We also discussed fixation strategies, including cemented and cementless options, and the types of implants to be used, along with their advantages and disadvantages. Nonoperative alternatives were covered, and informed consent was obtained after addressing all questions.    No guarantees were made regarding the outcome, and we decided together on this plan of action. A front-wheel walker will be required in the  "perioperative period to assist with balance, weakness, and pain during ambulation, and to help prevent falls. Details of the surgery and rehabilitation were also covered. The patient has been provided with the \"Treatment Options for Knee Arthritis\" document and the AAOS Knee Conditioning Program, including physician-directed therapy exercises. The patient has chosen to proceed with the surgery and is scheduled accordingly.      All questions were answered, patient agreeable with the plan.        Diagnoses:  Encounter Diagnoses:   Primary osteoarthritis of left knee    Postoperative pain    Orders Placed This Encounter    aspirin (ASPIRIN 81) 81 MG EC tablet    docusate sodium (COLACE) 100 MG Cap    meloxicam (MOBIC) 7.5 MG Tab    ondansetron (ZOFRAN ODT) 4 MG TABLET DISPERSIBLE    Naloxone (NARCAN) 4 MG/0.1ML Liquid    acetaminophen (TYLENOL) 500 MG Tab    HYDROcodone-acetaminophen (NORCO) 5-325 MG Tab per tablet    cyclobenzaprine (FLEXERIL) 10 mg Tab     Return for surgery.        Jake Bowman M.D.   [facility]  [facility]        **Please note that this dictation was created using voice recognition software. I have made every reasonable attempt to correct obvious errors, but I anticipate that there may be errors of grammar and possibly content that I did not discover before finalizing the note.  "

## 2025-01-16 ENCOUNTER — ANESTHESIA EVENT (OUTPATIENT)
Dept: SURGERY | Facility: MEDICAL CENTER | Age: 70
End: 2025-01-16
Payer: MEDICARE

## 2025-01-16 ENCOUNTER — HOSPITAL ENCOUNTER (OUTPATIENT)
Facility: MEDICAL CENTER | Age: 70
End: 2025-01-16
Attending: STUDENT IN AN ORGANIZED HEALTH CARE EDUCATION/TRAINING PROGRAM | Admitting: STUDENT IN AN ORGANIZED HEALTH CARE EDUCATION/TRAINING PROGRAM
Payer: MEDICARE

## 2025-01-16 ENCOUNTER — ANESTHESIA (OUTPATIENT)
Dept: SURGERY | Facility: MEDICAL CENTER | Age: 70
End: 2025-01-16
Payer: MEDICARE

## 2025-01-16 VITALS
BODY MASS INDEX: 28.11 KG/M2 | DIASTOLIC BLOOD PRESSURE: 71 MMHG | WEIGHT: 195.88 LBS | TEMPERATURE: 97 F | OXYGEN SATURATION: 92 % | SYSTOLIC BLOOD PRESSURE: 128 MMHG | RESPIRATION RATE: 18 BRPM | HEART RATE: 77 BPM

## 2025-01-16 DIAGNOSIS — M17.12 PRIMARY OSTEOARTHRITIS OF LEFT KNEE: ICD-10-CM

## 2025-01-16 PROBLEM — I10 HTN (HYPERTENSION): Status: ACTIVE | Noted: 2025-01-16

## 2025-01-16 PROCEDURE — 160047 HCHG PACU  - EA ADDL 30 MINS PHASE II: Performed by: STUDENT IN AN ORGANIZED HEALTH CARE EDUCATION/TRAINING PROGRAM

## 2025-01-16 PROCEDURE — 160025 RECOVERY II MINUTES (STATS): Performed by: STUDENT IN AN ORGANIZED HEALTH CARE EDUCATION/TRAINING PROGRAM

## 2025-01-16 PROCEDURE — 700101 HCHG RX REV CODE 250: Performed by: STUDENT IN AN ORGANIZED HEALTH CARE EDUCATION/TRAINING PROGRAM

## 2025-01-16 PROCEDURE — 160002 HCHG RECOVERY MINUTES (STAT): Performed by: STUDENT IN AN ORGANIZED HEALTH CARE EDUCATION/TRAINING PROGRAM

## 2025-01-16 PROCEDURE — 160035 HCHG PACU - 1ST 60 MINS PHASE I: Performed by: STUDENT IN AN ORGANIZED HEALTH CARE EDUCATION/TRAINING PROGRAM

## 2025-01-16 PROCEDURE — 502714 HCHG ROBOTIC SURGERY SERVICES: Performed by: STUDENT IN AN ORGANIZED HEALTH CARE EDUCATION/TRAINING PROGRAM

## 2025-01-16 PROCEDURE — 27447 TOTAL KNEE ARTHROPLASTY: CPT | Mod: LT | Performed by: STUDENT IN AN ORGANIZED HEALTH CARE EDUCATION/TRAINING PROGRAM

## 2025-01-16 PROCEDURE — 700111 HCHG RX REV CODE 636 W/ 250 OVERRIDE (IP): Performed by: STUDENT IN AN ORGANIZED HEALTH CARE EDUCATION/TRAINING PROGRAM

## 2025-01-16 PROCEDURE — 700105 HCHG RX REV CODE 258: Performed by: STUDENT IN AN ORGANIZED HEALTH CARE EDUCATION/TRAINING PROGRAM

## 2025-01-16 PROCEDURE — 160046 HCHG PACU - 1ST 60 MINS PHASE II: Performed by: STUDENT IN AN ORGANIZED HEALTH CARE EDUCATION/TRAINING PROGRAM

## 2025-01-16 PROCEDURE — 160031 HCHG SURGERY MINUTES - 1ST 30 MINS LEVEL 5: Performed by: STUDENT IN AN ORGANIZED HEALTH CARE EDUCATION/TRAINING PROGRAM

## 2025-01-16 PROCEDURE — 64447 NJX AA&/STRD FEMORAL NRV IMG: CPT | Performed by: STUDENT IN AN ORGANIZED HEALTH CARE EDUCATION/TRAINING PROGRAM

## 2025-01-16 PROCEDURE — C1776 JOINT DEVICE (IMPLANTABLE): HCPCS | Performed by: STUDENT IN AN ORGANIZED HEALTH CARE EDUCATION/TRAINING PROGRAM

## 2025-01-16 PROCEDURE — 160036 HCHG PACU - EA ADDL 30 MINS PHASE I: Performed by: STUDENT IN AN ORGANIZED HEALTH CARE EDUCATION/TRAINING PROGRAM

## 2025-01-16 PROCEDURE — 700111 HCHG RX REV CODE 636 W/ 250 OVERRIDE (IP)

## 2025-01-16 PROCEDURE — 160048 HCHG OR STATISTICAL LEVEL 1-5: Performed by: STUDENT IN AN ORGANIZED HEALTH CARE EDUCATION/TRAINING PROGRAM

## 2025-01-16 PROCEDURE — 160042 HCHG SURGERY MINUTES - EA ADDL 1 MIN LEVEL 5: Performed by: STUDENT IN AN ORGANIZED HEALTH CARE EDUCATION/TRAINING PROGRAM

## 2025-01-16 PROCEDURE — C1713 ANCHOR/SCREW BN/BN,TIS/BN: HCPCS | Performed by: STUDENT IN AN ORGANIZED HEALTH CARE EDUCATION/TRAINING PROGRAM

## 2025-01-16 PROCEDURE — 97535 SELF CARE MNGMENT TRAINING: CPT

## 2025-01-16 PROCEDURE — 700102 HCHG RX REV CODE 250 W/ 637 OVERRIDE(OP): Performed by: STUDENT IN AN ORGANIZED HEALTH CARE EDUCATION/TRAINING PROGRAM

## 2025-01-16 PROCEDURE — 160009 HCHG ANES TIME/MIN: Performed by: STUDENT IN AN ORGANIZED HEALTH CARE EDUCATION/TRAINING PROGRAM

## 2025-01-16 PROCEDURE — A9270 NON-COVERED ITEM OR SERVICE: HCPCS | Performed by: STUDENT IN AN ORGANIZED HEALTH CARE EDUCATION/TRAINING PROGRAM

## 2025-01-16 PROCEDURE — 20985 CPTR-ASST DIR MS PX: CPT | Performed by: STUDENT IN AN ORGANIZED HEALTH CARE EDUCATION/TRAINING PROGRAM

## 2025-01-16 PROCEDURE — 700111 HCHG RX REV CODE 636 W/ 250 OVERRIDE (IP): Mod: JZ | Performed by: STUDENT IN AN ORGANIZED HEALTH CARE EDUCATION/TRAINING PROGRAM

## 2025-01-16 PROCEDURE — 97162 PT EVAL MOD COMPLEX 30 MIN: CPT

## 2025-01-16 DEVICE — INSERT TIBIAL TRAITHLON X3 CONDYLAR STABILIZE SIZE 5 9MM (1EA): Type: IMPLANTABLE DEVICE | Site: KNEE | Status: FUNCTIONAL

## 2025-01-16 DEVICE — BASEPLATE TIBIAL TRIATHLON TRITANIUM SIZE 5 (1EA): Type: IMPLANTABLE DEVICE | Site: KNEE | Status: FUNCTIONAL

## 2025-01-16 DEVICE — COMPONENT FEMORAL TRIATHLON CRUCIATE RETAIN LEFT SIZE 5 (1EA): Type: IMPLANTABLE DEVICE | Site: KNEE | Status: FUNCTIONAL

## 2025-01-16 DEVICE — COMPONENT PATELLAR TRIATHLON TRITANIUM ASYMMETRIC METAL BACKED PATELLA 35X10MM (1EA): Type: IMPLANTABLE DEVICE | Site: KNEE | Status: FUNCTIONAL

## 2025-01-16 RX ORDER — MAGNESIUM SULFATE HEPTAHYDRATE 40 MG/ML
INJECTION, SOLUTION INTRAVENOUS PRN
Status: DISCONTINUED | OUTPATIENT
Start: 2025-01-16 | End: 2025-01-16 | Stop reason: SURG

## 2025-01-16 RX ORDER — CEFAZOLIN SODIUM 1 G/3ML
2 INJECTION, POWDER, FOR SOLUTION INTRAMUSCULAR; INTRAVENOUS ONCE
Status: COMPLETED | OUTPATIENT
Start: 2025-01-16 | End: 2025-01-16

## 2025-01-16 RX ORDER — EPHEDRINE SULFATE 50 MG/ML
5 INJECTION, SOLUTION INTRAVENOUS
Status: DISCONTINUED | OUTPATIENT
Start: 2025-01-16 | End: 2025-01-16 | Stop reason: HOSPADM

## 2025-01-16 RX ORDER — ONDANSETRON 2 MG/ML
INJECTION INTRAMUSCULAR; INTRAVENOUS PRN
Status: DISCONTINUED | OUTPATIENT
Start: 2025-01-16 | End: 2025-01-16 | Stop reason: SURG

## 2025-01-16 RX ORDER — DIPHENHYDRAMINE HYDROCHLORIDE 50 MG/ML
12.5 INJECTION INTRAMUSCULAR; INTRAVENOUS
Status: DISCONTINUED | OUTPATIENT
Start: 2025-01-16 | End: 2025-01-16 | Stop reason: HOSPADM

## 2025-01-16 RX ORDER — ROPIVACAINE HYDROCHLORIDE 5 MG/ML
INJECTION, SOLUTION EPIDURAL; INFILTRATION; PERINEURAL PRN
Status: DISCONTINUED | OUTPATIENT
Start: 2025-01-16 | End: 2025-01-16 | Stop reason: SURG

## 2025-01-16 RX ORDER — DEXAMETHASONE SODIUM PHOSPHATE 4 MG/ML
INJECTION, SOLUTION INTRA-ARTICULAR; INTRALESIONAL; INTRAMUSCULAR; INTRAVENOUS; SOFT TISSUE PRN
Status: DISCONTINUED | OUTPATIENT
Start: 2025-01-16 | End: 2025-01-16 | Stop reason: SURG

## 2025-01-16 RX ORDER — SODIUM CHLORIDE 9 MG/ML
INJECTION, SOLUTION INTRAMUSCULAR; INTRAVENOUS; SUBCUTANEOUS
Status: DISCONTINUED | OUTPATIENT
Start: 2025-01-16 | End: 2025-01-16 | Stop reason: HOSPADM

## 2025-01-16 RX ORDER — OXYCODONE HCL 5 MG/5 ML
5 SOLUTION, ORAL ORAL
Status: COMPLETED | OUTPATIENT
Start: 2025-01-16 | End: 2025-01-16

## 2025-01-16 RX ORDER — HYDROMORPHONE HYDROCHLORIDE 1 MG/ML
0.2 INJECTION, SOLUTION INTRAMUSCULAR; INTRAVENOUS; SUBCUTANEOUS
Status: DISCONTINUED | OUTPATIENT
Start: 2025-01-16 | End: 2025-01-16 | Stop reason: HOSPADM

## 2025-01-16 RX ORDER — HYDROMORPHONE HYDROCHLORIDE 1 MG/ML
0.1 INJECTION, SOLUTION INTRAMUSCULAR; INTRAVENOUS; SUBCUTANEOUS
Status: DISCONTINUED | OUTPATIENT
Start: 2025-01-16 | End: 2025-01-16 | Stop reason: HOSPADM

## 2025-01-16 RX ORDER — LABETALOL HYDROCHLORIDE 5 MG/ML
5 INJECTION, SOLUTION INTRAVENOUS
Status: DISCONTINUED | OUTPATIENT
Start: 2025-01-16 | End: 2025-01-16 | Stop reason: HOSPADM

## 2025-01-16 RX ORDER — PHENYLEPHRINE HCL IN 0.9% NACL 1 MG/10 ML
SYRINGE (ML) INTRAVENOUS PRN
Status: DISCONTINUED | OUTPATIENT
Start: 2025-01-16 | End: 2025-01-16 | Stop reason: SURG

## 2025-01-16 RX ORDER — HYDROMORPHONE HYDROCHLORIDE 2 MG/ML
INJECTION, SOLUTION INTRAMUSCULAR; INTRAVENOUS; SUBCUTANEOUS PRN
Status: DISCONTINUED | OUTPATIENT
Start: 2025-01-16 | End: 2025-01-16 | Stop reason: SURG

## 2025-01-16 RX ORDER — OXYCODONE HCL 5 MG/5 ML
10 SOLUTION, ORAL ORAL
Status: COMPLETED | OUTPATIENT
Start: 2025-01-16 | End: 2025-01-16

## 2025-01-16 RX ORDER — ONDANSETRON 2 MG/ML
4 INJECTION INTRAMUSCULAR; INTRAVENOUS
Status: DISCONTINUED | OUTPATIENT
Start: 2025-01-16 | End: 2025-01-16 | Stop reason: HOSPADM

## 2025-01-16 RX ORDER — DEXMEDETOMIDINE HYDROCHLORIDE 100 UG/ML
INJECTION, SOLUTION INTRAVENOUS PRN
Status: DISCONTINUED | OUTPATIENT
Start: 2025-01-16 | End: 2025-01-16 | Stop reason: SURG

## 2025-01-16 RX ORDER — HYDROMORPHONE HYDROCHLORIDE 1 MG/ML
0.4 INJECTION, SOLUTION INTRAMUSCULAR; INTRAVENOUS; SUBCUTANEOUS
Status: DISCONTINUED | OUTPATIENT
Start: 2025-01-16 | End: 2025-01-16 | Stop reason: HOSPADM

## 2025-01-16 RX ORDER — SODIUM CHLORIDE, SODIUM LACTATE, POTASSIUM CHLORIDE, CALCIUM CHLORIDE 600; 310; 30; 20 MG/100ML; MG/100ML; MG/100ML; MG/100ML
INJECTION, SOLUTION INTRAVENOUS CONTINUOUS
Status: ACTIVE | OUTPATIENT
Start: 2025-01-16 | End: 2025-01-16

## 2025-01-16 RX ORDER — HALOPERIDOL 5 MG/ML
1 INJECTION INTRAMUSCULAR
Status: DISCONTINUED | OUTPATIENT
Start: 2025-01-16 | End: 2025-01-16 | Stop reason: HOSPADM

## 2025-01-16 RX ORDER — LABETALOL HYDROCHLORIDE 5 MG/ML
INJECTION, SOLUTION INTRAVENOUS PRN
Status: DISCONTINUED | OUTPATIENT
Start: 2025-01-16 | End: 2025-01-16 | Stop reason: SURG

## 2025-01-16 RX ORDER — ACETAMINOPHEN 500 MG
1000 TABLET ORAL ONCE
Status: COMPLETED | OUTPATIENT
Start: 2025-01-16 | End: 2025-01-16

## 2025-01-16 RX ORDER — CELECOXIB 200 MG/1
400 CAPSULE ORAL ONCE
Status: COMPLETED | OUTPATIENT
Start: 2025-01-16 | End: 2025-01-16

## 2025-01-16 RX ORDER — TRANEXAMIC ACID 100 MG/ML
INJECTION, SOLUTION INTRAVENOUS PRN
Status: DISCONTINUED | OUTPATIENT
Start: 2025-01-16 | End: 2025-01-16 | Stop reason: SURG

## 2025-01-16 RX ORDER — LIDOCAINE HYDROCHLORIDE 20 MG/ML
INJECTION, SOLUTION EPIDURAL; INFILTRATION; INTRACAUDAL; PERINEURAL PRN
Status: DISCONTINUED | OUTPATIENT
Start: 2025-01-16 | End: 2025-01-16 | Stop reason: SURG

## 2025-01-16 RX ORDER — HYDRALAZINE HYDROCHLORIDE 20 MG/ML
5 INJECTION INTRAMUSCULAR; INTRAVENOUS
Status: DISCONTINUED | OUTPATIENT
Start: 2025-01-16 | End: 2025-01-16 | Stop reason: HOSPADM

## 2025-01-16 RX ORDER — ROPIVACAINE HCL/PF 100MG/20ML
SYRINGE (ML) INJECTION
Status: DISCONTINUED | OUTPATIENT
Start: 2025-01-16 | End: 2025-01-16 | Stop reason: HOSPADM

## 2025-01-16 RX ORDER — ALBUTEROL SULFATE 5 MG/ML
2.5 SOLUTION RESPIRATORY (INHALATION)
Status: DISCONTINUED | OUTPATIENT
Start: 2025-01-16 | End: 2025-01-16 | Stop reason: HOSPADM

## 2025-01-16 RX ADMIN — CELECOXIB 400 MG: 200 CAPSULE ORAL at 09:35

## 2025-01-16 RX ADMIN — FENTANYL CITRATE 50 MCG: 50 INJECTION, SOLUTION INTRAMUSCULAR; INTRAVENOUS at 09:41

## 2025-01-16 RX ADMIN — TRANEXAMIC ACID 1000 MG: 100 INJECTION, SOLUTION INTRAVENOUS at 11:25

## 2025-01-16 RX ADMIN — DEXAMETHASONE SODIUM PHOSPHATE 8 MG: 4 INJECTION INTRA-ARTICULAR; INTRALESIONAL; INTRAMUSCULAR; INTRAVENOUS; SOFT TISSUE at 09:57

## 2025-01-16 RX ADMIN — ONDANSETRON 4 MG: 2 INJECTION INTRAMUSCULAR; INTRAVENOUS at 11:31

## 2025-01-16 RX ADMIN — LABETALOL HYDROCHLORIDE 5 MG: 5 INJECTION, SOLUTION INTRAVENOUS at 10:24

## 2025-01-16 RX ADMIN — FENTANYL CITRATE 25 MCG: 50 INJECTION, SOLUTION INTRAMUSCULAR; INTRAVENOUS at 13:39

## 2025-01-16 RX ADMIN — ACETAMINOPHEN 1000 MG: 500 TABLET ORAL at 09:35

## 2025-01-16 RX ADMIN — ROPIVACAINE HYDROCHLORIDE 15 ML: 5 INJECTION EPIDURAL; INFILTRATION; PERINEURAL at 09:44

## 2025-01-16 RX ADMIN — SODIUM CHLORIDE, POTASSIUM CHLORIDE, SODIUM LACTATE AND CALCIUM CHLORIDE: 600; 310; 30; 20 INJECTION, SOLUTION INTRAVENOUS at 09:51

## 2025-01-16 RX ADMIN — OXYCODONE HYDROCHLORIDE 10 MG: 5 SOLUTION ORAL at 12:39

## 2025-01-16 RX ADMIN — PROPOFOL 150 MG: 10 INJECTION, EMULSION INTRAVENOUS at 09:57

## 2025-01-16 RX ADMIN — MAGNESIUM SULFATE HEPTAHYDRATE 4 G: 4 INJECTION, SOLUTION INTRAVENOUS at 09:51

## 2025-01-16 RX ADMIN — TRANEXAMIC ACID 1000 MG: 100 INJECTION, SOLUTION INTRAVENOUS at 09:57

## 2025-01-16 RX ADMIN — Medication 50 MCG: at 10:39

## 2025-01-16 RX ADMIN — FENTANYL CITRATE 50 MCG: 50 INJECTION, SOLUTION INTRAMUSCULAR; INTRAVENOUS at 09:57

## 2025-01-16 RX ADMIN — FENTANYL CITRATE 50 MCG: 50 INJECTION, SOLUTION INTRAMUSCULAR; INTRAVENOUS at 13:10

## 2025-01-16 RX ADMIN — LIDOCAINE HYDROCHLORIDE 60 MG: 20 INJECTION, SOLUTION EPIDURAL; INFILTRATION; INTRACAUDAL; PERINEURAL at 09:57

## 2025-01-16 RX ADMIN — CEFAZOLIN 2 G: 1 INJECTION, POWDER, FOR SOLUTION INTRAMUSCULAR; INTRAVENOUS at 09:57

## 2025-01-16 RX ADMIN — DEXMEDETOMIDINE HYDROCHLORIDE 15 MCG: 100 INJECTION, SOLUTION INTRAVENOUS at 09:44

## 2025-01-16 RX ADMIN — FENTANYL CITRATE 50 MCG: 50 INJECTION, SOLUTION INTRAMUSCULAR; INTRAVENOUS at 12:37

## 2025-01-16 RX ADMIN — HYDROMORPHONE HYDROCHLORIDE 1 MG: 2 INJECTION INTRAMUSCULAR; INTRAVENOUS; SUBCUTANEOUS at 10:24

## 2025-01-16 RX ADMIN — Medication 50 MCG: at 10:42

## 2025-01-16 ASSESSMENT — PAIN DESCRIPTION - PAIN TYPE
TYPE: SURGICAL PAIN
TYPE: CHRONIC PAIN
TYPE: SURGICAL PAIN

## 2025-01-16 ASSESSMENT — COGNITIVE AND FUNCTIONAL STATUS - GENERAL
MOBILITY SCORE: 21
SUGGESTED CMS G CODE MODIFIER MOBILITY: CJ
STANDING UP FROM CHAIR USING ARMS: A LITTLE
WALKING IN HOSPITAL ROOM: A LITTLE
CLIMB 3 TO 5 STEPS WITH RAILING: A LITTLE

## 2025-01-16 ASSESSMENT — PAIN SCALES - GENERAL: PAIN_LEVEL: 3

## 2025-01-16 ASSESSMENT — GAIT ASSESSMENTS
DEVIATION: ANTALGIC;STEP TO
DISTANCE (FEET): 150
GAIT LEVEL OF ASSIST: STANDBY ASSIST
ASSISTIVE DEVICE: FRONT WHEEL WALKER

## 2025-01-16 ASSESSMENT — FIBROSIS 4 INDEX: FIB4 SCORE: 1.23

## 2025-01-16 NOTE — ANESTHESIA POSTPROCEDURE EVALUATION
Patient: Alberto Bowles    Procedure Summary       Date: 01/16/25 Room / Location:  OR  / SURGERY [facility]    Anesthesia Start: 0951 Anesthesia Stop: 1200    Procedure: ROBOTIC LEFT TOTAL KNEE ARTHROPLASTY (Left: Knee) Diagnosis:       Primary osteoarthritis of left knee      (Primary osteoarthritis of left knee [M17.12])    Surgeons: Jake Bowman M.D. Responsible Provider: Pastor Lacey M.D.    Anesthesia Type: general, peripheral nerve block ASA Status: 2            Final Anesthesia Type: general, peripheral nerve block  Last vitals  BP   Blood Pressure : (!) 147/90    Temp   36.2 °C (97.2 °F)    Pulse   71   Resp   16    SpO2   95 %      Anesthesia Post Evaluation    Patient location during evaluation: PACU  Patient participation: complete - patient participated  Level of consciousness: awake and alert  Pain score: 3    Airway patency: patent  Anesthetic complications: no  Cardiovascular status: hemodynamically stable  Respiratory status: acceptable  Hydration status: euvolemic    PONV: none          There were no known notable events for this encounter.     Nurse Pain Score: 3 (NPRS)

## 2025-01-16 NOTE — ANESTHESIA PROCEDURE NOTES
Peripheral Block    Date/Time: 1/16/2025 9:44 AM    Performed by: Pastor Lacey M.D.  Authorized by: Pastor Lacey M.D.    Start Time:  1/16/2025 9:44 AM  Reason for Block: at surgeon's request and post-op pain management ONLY    patient identified, IV checked, site marked, risks and benefits discussed, surgical consent, monitors and equipment checked, pre-op evaluation and timeout performed    Patient Position:  Supine  Prep: ChloraPrep    Monitoring:  Heart rate, continuous pulse ox and cardiac monitor  Block Region:  Lower Extremity  Lower Extremity - Block Type:  Selective FEMORAL nerve block at the Adductor Canal    Laterality:  Left  Procedures: ultrasound guided  Image captured, interpreted and electronically stored.  Local Infiltration:  Lidocaine  Strength:  1 %  Dose:  3 ml  Block Type:  Single-shot  Needle Localization:  Ultrasound guidance  Ultrasound picture in chart  Injection Assessment:  Negative aspiration for heme, no paresthesia on injection, incremental injection and local visualized surrounding nerve on ultrasound  Evidence of intravascular injection: No

## 2025-01-16 NOTE — OP REPORT
Surgeon: Jake Bowman M.D.      1st Assist: Adeola COUCH    Assistant(s):  Circulator: Adeola Mulligan R.N.  Limb Bhakta: Diana Lackeyford  Relief Circulator: Rafaela Sierra R.N.  Scrub Person: Negrita MANUELAnthony Cody; Jocelyn Pleitez  First Assist: IZA Carnes     Procedure(s):  left Total Knee Arthroplasty (robotic-assisted)- CPT 27633    Preoperative Diagnosis:  Severe left  Knee Primary Osteoarthritis    Postoperative Diagnoses:  Same    Anesthesia:   General anesthetic    Operative Findings: Severe Tri-compartmental knee arthritis.  Preoperative range of motion under anesthesia was -1-120 degrees with varus deformity.  Range of motion at the completion of the case with the capsule closed was 0-120 degrees.  Bone quality was excellent to support cementless implants.    Attestation:  I was present for and performed all critical portions  of the case. First assist was necessary for safe patient positioning, sterile prepping and draping, assistance, positioning and protection, soft tissue retraction, protection of vital structures and suture management during the case, and superficial wound closure.   Also critical for safe transit to the recovery room in stable condition.     IMPLANTS:  A [employer] Triathlon  total knee arthroplasty system with the following components  Femur: CR  Femoral Component, Size 5  Tibia: Triathlon total knee Tritanium tibial baseplate , Size 5  Polyethylene: CS X3 Tibial Insert, Size 9 mm  Patella: Tritanium Patella, Size 35  Fixation: Cementless    ESTIMATED BLOOD LOSS: 150 cc.    DRAINS: None.    SPECIMENS: None.    COMPLICATIONS: None.    COUNTS: Correct    * Missing case tracking time(s) *     Event Time In   Admitting    Arrived  6:57 AM   In Pre-Op  7:10 AM   Pre-Op Complete  9:15 AM   Out Pre-Op    Room Setup Start    Room Setup Complete  9:45 AM   Anesthesia Start  9:51 AM   Sedation Start    Perfusion Start    Anesthesia Ready 10:00 AM   Int Rad    In Room   9:51 AM   Procedure(s) Start 10:07 AM   Incision (if different from start)    Procedure(s) End 11:51 AM   Out Room    Perfusion Stop    Anesthesia End    Sedation End    Room Cleanup Complete    In PACU    Phase I Recovery Complete    Out PACU    In Phase II    Phase II Recovery Complete    Out Phase II    Procedural Charting Complete (Discharged)         Procedure Narrative:    OPERATIVE INDICATIONS:  Alberto Bowles  is a 69 y.o.  year old male  with a long history of progressive left  knee pain, arthritis, and degeneration.  Non-operative treatment has been attempted  but has not improved or controlled the symptoms and pain that occurs during normal daily activities. Knee motion has also become limited and is restricting the patient.  Total knee arthroplasty was recommended. The risks, benefits and potential complications of the arthroplasty surgery were discussed with the patient in detail. Specific details of the surgical procedure, hospitalization, recovery, rehabilitation, and long-term precautions were also presented. Pre-operative teaching was provided. Implant/prosthesis selection was outlined, and the many options available were explained; the final choice will be made at the time of the procedure to match the anatomy and condition of the bone, ligaments, tendons, and muscles.    The patient was evaluated medically for pre-operative optimization, and risk assessment. Mine-operative blood management and the potential for blood transfusion were discussed with risks and options clearly outlined. Understanding of all topics was conveyed to me by the patient pre-operatively, and patient consent was given to proceed with the left total knee replacement, robotically assisted.      OPERATIVE PROCEDURE: The patient was identified and brought into the Operating Room by the anesthesia and nursing teams. Anesthesia  was successfully performed. The patient was then positioned supine on the operating room table,  "and all bony prominences were padded.  Intravenous antibiotic prophylaxis dosing was confirmed. A tourniquet was applied to the upper thigh. The leg was prepped and draped in the usual sterile fashion.    The preoperative plan with CT scan templating was reviewed.  Adjustments were made to ensure appropriate fit and position of implants.    A surgical time-out was performed immediately preceding the incision with all personnel in the operating room; the patient identity was again confirmed, the correct knee surgical site and extremity were identified and confirmed, X-rays were reviewed, and availability of the appropriate surgical equipment was established.    The knee was exsanguinated and exposed using an anterior-midline skin incision. Dissection was carried down through skin and subcutaneous tissue to the extensor mechanism with a scalpel.  The superficial scalpel was exchanged for a deep scalpel. Medial and lateral flaps were raised to facilitate adequate exposure. A median para-patellar arthrotomy was made to enter the knee space sharply. A large amount of normal appearing joint fluid was encountered and suctioned.  The synovium was thickened, hypertrophic, and inflamed.    The anterolateral tibia was exposed with bovie while protecting the patellar tendon, The patellofemoral ligaments were released. Partial fat pad removal was performed to aid in visualization.  The patella was then measured and a patellar resection of 9-10 mm was carried out. The medial release performed off of the tibia directly. The knee was flexed and the medial and lateral menisci anteiror horns were released. The ACL was excised. An anterior \"sail\" of synovium was removed from the femur to allow easier later sizing.    Assessment of the knee joint revealed severe end-stage articular cartilage damage.    The femoral arrays were drilled and placed.  This was with bicortical purchase, just ending with initial bite at the second cortex.    " The tibial arrays were drilled and placed, again with bicortical purchase, just ending with initial bite at the second cortex.  The femoral & tibial checkpoints were then performed for registration.   These were all found to be within acceptable range.    Tenting osteophytes were removed.    The knee was then balanced in flexion & extension using manual stress as well as the spoon spacers.  The preoperative plan was again reviewed, which had been reviewed and preoperative assessment, and changes were made to the angles of the cuts and rotation of the implants in order to allow for balanced flexion and extension.    Once we were pleased with the balance based on this plan, the cuts were performed.  The sequence of cuts were as follows: Tibia, posterior femur, anterior chamfer, anterior femur,  blade change, distal femur, posterior chamfer.    With all cuts having been made, the menisci were removed. The posterior ostephytes were removed with osteotome and rongeur.    Releases made for balancing included: none    The tibia was then pinned in place with appropriate rotation based on tibial tubercle.The trial femur was impacted. A trial poly was placed and the knee was ranged. It was found to be well-balanced with 9mm poly insert, with ROM 0-120.  Excellent varus valgus stability was achieved throughout the range of motion.  The patella diameter was sized according to best fit and the drill holes were placed for the asymmetric patellar peg pattern.Femoral fixation holes were drilled prior to removing the trials ensuring proper position of the femoral component in regards to medial lateral translation.    Balance and range of motion were confirmed visually and with robotic navigation a final time.    The trial poly was removed.    The femoral arrays and checkpoint, as well as tibial arrays and checkpoint, were removed.    The bony surfaces were inspected and deemed appropriate for cementless implants. The bony surfaces  were prepared with pulsatile lavage.  The press-fit tibial baseplate,  femoral component and asymmetric patellar were implanted.  Secure fixation was achieved. The knee was copiously irrigated and the tourniquet was let down.  Hemostasis was achieved with electrocautery and patellar tracking was then inspected and tracked midline. Lateral release was not required.     The wound was again irrigated, this time with dilute betadine.    The extensor mechanism was closed with Quill running and Ethibon interrupted sutures.  Knee stability and range of motion with the capsule closed was excellent, and range of motion was 0 to 125 without excessive stress on the repair. Instrument and sponge count was completed and confirmed correct. The subcutaneous tissues were closed with 2-0 monocryl interrupted sutures, and the skin was closed with a running 3-0 monocryl stitch.  Dermabond skin adhesive was applied to the skin incision with the knee in flexion.  A MAXIMO suction dressing was applied again with the knee in flexion, followed by a compressive wrap.  The patient was then transferred to PACU.        POSTOPERATIVE PLAN:  Weight-bearing as tolerated (WBAT) with a walker due to weakness, imbalance, and fall risk.  Venous thromboembolism (VTE) prophylaxis for 4 weeks.  Physical therapy (PT) and occupational therapy (OT) evaluation.  Follow-up in 2 weeks at the clinic.

## 2025-01-16 NOTE — ANESTHESIA PROCEDURE NOTES
Airway    Date/Time: 1/16/2025 9:59 AM    Performed by: Pastor Lacey M.D.  Authorized by: Pastor Lacey M.D.    Location:  OR  Urgency:  Elective  Difficult Airway: No    Indications for Airway Management:  Anesthesia      Spontaneous Ventilation: absent    Sedation Level:  Deep  Preoxygenated: Yes    Mask Difficulty Assessment:  0 - not attempted  Final Airway Type:  Supraglottic airway  Final Supraglottic Airway:  Standard LMA    SGA Size:  5  Number of Attempts at Approach:  1

## 2025-01-16 NOTE — OR NURSING
1200 Pt arrived to PACU from OR. VSS. 6L Simple mask in place. Pt is resting comfortably. Non-labored respirations. Pt is spontaneous breathing. Surgical dressing with ANN MARSH. Cloth covered ice pack in place. SCDs in place. No complaints regarding nausea at this time. Pt reports pain, anesthesia administered pain medications.     1208 Pt is trying to sit straight up. 2L NC in place. Repositioned pt.   CMS: 2+ pedal pulses, brisk cap refill present, warm to touch. Plantar and dorsal flex and wiggle toes. Sensation is intact, denies numbness and tingling.       1232 Handoff to MIKALA Nagy

## 2025-01-16 NOTE — OR NURSING
Patient allergies and NPO status verified, home medication reconciliation completed and belongings secured. Surgical site verified with patient. Patient verbalizes understanding of pain scale, expected course of stay and plan of care; patient and family state verbal understanding at this time. IV access established. Sequential in place as ordered.     Pt has allergic reaction to face (rash/swelling) after 3 days using Ibuprofen but does take Mobic and Aleve regularly without any issues. Dr Lacey aware after discussing with RN and patient and ordered oral Celebrex; pt agrees at this time as well.     Patient plans to d/c today with wife as support, needs crutches/walker and does have post op RX at home per pt.

## 2025-01-16 NOTE — ANESTHESIA PREPROCEDURE EVALUATION
Case: [id number] Date/Time: 01/16/25 0945    Procedure: ROBOTIC LEFT TOTAL KNEE ARTHROPLASTY    Diagnosis: Primary osteoarthritis of left knee [M17.12]    Pre-op diagnosis: Primary osteoarthritis of left knee [M17.12]    Location: [place] [address] / SURGERY [facility]    Surgeons: Jake Bowman M.D.            Relevant Problems   PULMONARY  Remote prior laryngeal cancer resections x2, unknown extent of resection      CARDIAC   (positive) HTN (hypertension)      Other   (positive) Alcohol use disorder   (positive) Cigarette nicotine dependence without complication   (positive) Hypercholesterolemia   (positive) Osteoarthritis, knee   (positive) Overweight (BMI 25.0-29.9)       Physical Exam    Airway   Mallampati: II  TM distance: >3 FB  Neck ROM: full       Cardiovascular - normal exam  Rhythm: regular  Rate: normal     Dental - normal exam           Pulmonary - normal exam  Breath sounds clear to auscultation     Abdominal    Neurological - normal exam                   Anesthesia Plan    ASA 2       Plan - general and peripheral nerve block     Peripheral nerve block will be post-op pain control  Airway plan will be LMA          Induction: intravenous    Postoperative Plan: Postoperative administration of opioids is intended.    Pertinent diagnostic labs and testing reviewed    Informed Consent:    Anesthetic plan and risks discussed with patient.    Use of blood products discussed with: patient whom consented to blood products.

## 2025-01-16 NOTE — THERAPY
Physical Therapy   Initial Evaluation     Patient Name: Alberto Bowles  Age:  69 y.o., Sex:  male  Medical Record #: [id number]  Today's Date: 1/16/2025     Precautions  Precautions: Weight Bearing As Tolerated Left Lower Extremity    Assessment  Patient is 69 y.o. male who was seen s/p left TKA with WBAT orders for left lower extremity. Pt was agreeable to therapy evaluation and presented with safe upright functional mobility with AD use after therapy session. Pt was provided with education on elevation, icing, positioning, and supine/seated therapeutic exercises. Pt was able to return demo safe gait mechanics with use of AD on flat level surfaces with appropriate heel to toe gait mechanics. Pt was able to return demo safe mechanics in order to navigate stairs with use of FWW. Pt was able to demonstrate safe use of AD during transfers/transitions and general locomotion with no mario LOB. Pt has no acute skilled PT needs at this time, anticipate pt to d/c home once medically clear with recs for FWW use and OP therapy services.     The pt was provided with the following skilled therapy txt: Pt was provided with VC, demo, and facilitation during gait training in order to return demo terminal knee extension and appropriate heel strike/flexion of knee in order to return demo appropriate heel to toe gait mechanics. Pt was provided with demo and VC to go up/down steps with safe mechanics with use of AD. Pt was provided with VC and TC for appropriate quad contraction and mechanics during supine/seated therapeutic exercises. Pt was provided with education on intensity, frequency, and duration of exercises.    Plan    Physical Therapy Initial Treatment Plan   Duration: Evaluation only    DC Equipment Recommendations: Front-Wheel Walker  Discharge Recommendations: Recommend outpatient physical therapy services to address higher level deficits     Objective       01/16/25 1480   Initial Contact Note    Initial Contact  Note Order Received and Verified, Evaluation Only - Patient Does Not Require Further Acute Physical Therapy at this Time.  However, May Benefit from Post Acute Therapy for Higher Level Functional Deficits.   Precautions   Precautions Weight Bearing As Tolerated Left Lower Extremity   Pain 0 - 10 Group   Location Knee   Location Orientation Left   Description Aching   Therapist Pain Assessment Prior to Activity;During Activity;Post Activity;Nurse Notified;5   Prior Living Situation   Prior Services None   Housing / Facility 1 Story House   Steps Into Home 2   Steps In Home 0   Equipment Owned None   Lives with - Patient's Self Care Capacity Spouse   Comments pt reports spouse will be able to assist upon d/c to home   Prior Level of Functional Mobility   Bed Mobility Independent   Transfer Status Independent   Ambulation Independent   Ambulation Distance   (community)   Assistive Devices Used None   Stairs Independent   Comments reports of an IPLOF   History of Falls   History of Falls No   Cognition    Cognition / Consciousness WDL   Passive ROM Upper Body   Passive ROM Upper Body WDL   Active ROM Upper Body   Active ROM Upper Body  WDL   Strength Upper Body   Upper Body Strength  WDL   Sensation Upper Body   Upper Extremity Sensation  WDL   Upper Body Muscle Tone   Upper Body Muscle Tone  WDL   Passive ROM Lower Body   Passive ROM Lower Body X   Comments limited due to pain, able to demo 0-60  deg of L knee ROM   Active ROM Lower Body    Active ROM Lower Body  X   Comments same as above   Strength Lower Body   Lower Body Strength  X   Comments limited due to pain, able to demo functional strength for B LE   Sensation Lower Body   Lower Extremity Sensation   WDL   Lower Body Muscle Tone   Lower Body Muscle Tone  WDL   Neurological Concerns   Neurological Concerns No   Coordination Upper Body   Coordination WDL   Coordination Lower Body    Coordination Lower Body  WDL   Balance Assessment   Sitting Balance (Static)  Good   Sitting Balance (Dynamic) Fair +   Standing Balance (Static) Fair +   Standing Balance (Dynamic) Fair +   Weight Shift Sitting Good   Weight Shift Standing Fair   Comments w/fww use, no mario LOB noted   Bed Mobility    Comments found up in chair   Gait Analysis   Gait Level Of Assist Standby Assist   Assistive Device Front Wheel Walker   Distance (Feet) 150   # of Times Distance was Traveled 1   Deviation Antalgic;Step To   # of Stairs Climbed 2   Level of Assist with Stairs Standby Assist   Functional Mobility   Sit to Stand Standby Assist   Bed, Chair, Wheelchair Transfer Standby Assist   Transfer Method Stand Step   Mobility sit<>stand, ambulation, stairs, transfer back to chair   6 Clicks Assessment - How much HELP from from another person do you currently need... (If the patient hasn't done an activity recently, how much help from another person do you think he/she would need if he/she tried?)   Turning from your back to your side while in a flat bed without using bedrails? 4   Moving from lying on your back to sitting on the side of a flat bed without using bedrails? 4   Moving to and from a bed to a chair (including a wheelchair)? 4   Standing up from a chair using your arms (e.g., wheelchair, or bedside chair)? 3   Walking in hospital room? 3   Climbing 3-5 steps with a railing? 3   6 clicks Mobility Score 21   Activity Tolerance   Sitting in Chair functional   Sitting Edge of Bed NT   Standing 10 mins   Comments no adverse events noted   Edema / Skin Assessment   Edema / Skin  Not Assessed   Education Group   Education Provided Role of Physical Therapist;Exercises - Supine;Exercises - Seated;Gait Training;Stair Training;Use of Assistive Device   Role of Physical Therapist Patient Response Patient;Acceptance;Explanation;Demonstration;Verbal Demonstration;Action Demonstration   Gait Training Patient Response Patient;Acceptance;Explanation;Demonstration;Verbal Demonstration;Action Demonstration   Stair  Training Patient Response Patient;Acceptance;Explanation;Demonstration;Verbal Demonstration;Action Demonstration   Use of Assistive Device Patient Response Patient;Acceptance;Explanation;Demonstration;Verbal Demonstration;Action Demonstration   Exercises - Supine Patient Response Patient;Acceptance;Explanation;Demonstration;Handout;Action Demonstration;Verbal Demonstration   Exercises - Seated Patient Response Patient;Acceptance;Explanation;Demonstration;Handout;Verbal Demonstration;Action Demonstration   Physical Therapy Initial Treatment Plan    Duration Evaluation only   Anticipated Discharge Equipment and Recommendations   DC Equipment Recommendations Front-Wheel Walker   Discharge Recommendations Recommend outpatient physical therapy services to address higher level deficits   Interdisciplinary Plan of Care Collaboration   IDT Collaboration with  Nursing   Patient Position at End of Therapy Seated;Call Light within Reach;Phone within Reach;Tray Table within Reach;Family / Friend in Room   Collaboration Comments aware of visit and recs   Session Information   Date / Session Number  1/16 eval only

## 2025-01-16 NOTE — DISCHARGE INSTR - OTHER INFO
Jake Bowman M.D.  Office: [phone]  Medical Assistant: Kristen Christina: [phone]  Surgical Coordinator: Sharla Ball: [phone]    Total Joint Replacement Discharge Instructions      Please refer to the preoperative education packet given in the office for general instructions.The following are additional specific important instructions related to your care. Please keep these and share with your other providers.       Procedure:  left total knee replacement      Weightbearing status: Weight bearing as tolerated. Use a walker for the first two weeks. If you don't have a walker, one will be provided for you before you discharge home from surgery.       Hip Restrictions: None       Knee Restrictions: Physical therapy for aggressive knee range of motion as tolerated.  For knee replacement patients - If your knee range of motion is under 90 degrees at 3-4 weeks postop, please call the office.      Blood Clot Prophylaxis    There is an increased risk of blood clots in the first few weeks after surgery. The most effective method to prevent blood clots is to get up and move around frequently beginning right after surgery.     You have been prescribed this medication to lower your risk of forming blood clots. This medication is important to take until the prescription is finished. In most patients, this will be Aspirin 81 mg tablet, twice daily (1 pill in AM, 1 pill in PM) for 4 weeks. Alternative medications may be sued if you're at higher than usual risk, you've had a previous blood clot or already take a daily blood thinner.       Pain Control    It is normal to have pain after surgery. Pain medications have been prescribed for you.  Please call the office directly to discuss any medication refills. Once they are no longer needed, please dispose of unused narcotics at a pharmacy or police station.    It should be noted that pain medications take about one-half hour to start working, so take  them prior to the pain becoming severe. DO NOT drink alcohol while taking narcotic pain medication. It is dangerous and illegal to drive while taking narcotic pain medicine.    You may also take Tylenol over-the-counter as needed for pain.  You must be careful not to exceed 3,000mg (3 grams) of Tylenol, from all sources, within a single 24-hr period.      Infection Prevention    You were given intravenous antibiotics to help prevent surgical site infection while hospitalized.      Constipation Prevention    Narcotic pain medication can cause severe constipation.  Try to take the minimum dose of pain medications possible.    You were prescribed medications to prevent constipation.  Take these consistently while you are on narcotic pain medications.  Call the office if these are not effective.    In the very rare event of developing significant abdominal distention and/or severe abdominal pain, please seek medical assistance right away, as this can be the sign of an emergent problem.      Incision Care       MAXIMO Dressing Instructions:  A wound vac dressing has been utilized. This waterproof bandage is connected to a small vacuum pump that applies negative pressure, aiding in the healing of the incision. You may shower with the bandage on and do not need to cover the wound while showering; however, ensure that the battery pack remains dry. After showering, gently pat the area dry instead of rubbing it to avoid dislodging the bandage. Keep the dressing on until your follow-up appointment.    The battery pack on the dressing will typically stop functioning about 7 days post-surgery. When this occurs, please follow these steps:    1. Tie a knot in the flat tubing.  2. Ensure the knot is water-tight.  3. Cut off any excess tubing.  4. Dispose of the battery pack, as it cannot be reused.    Small spots of drainage, about the size of a quarter, on the dressing are normal. However, if the entire dressing becomes saturated,  contact Dr. Bowman's office immediately. If water from the shower gets underneath the dressing, remove it and call the office for further instructions.      Swelling and bruising    After surgery swelling and bruising of the operative leg is normal and will gradually decrease as the days pass. If activity and exercise worsens your swelling, take time to elevate the leg and apply ice over the area of swelling.      Diet    Typically, with adequate protein intake for promotion of healing, there are no special diet restrictions. Make sure you eat a well-balanced meal, drink plenty of fluids and incorporate fiber into your diet as oral pain medications cause constipation.  Go slowly and avoid greasy fried foods which can worsen constipation.      Activity    For the first few weeks after surgery, walk as much as possible without overdoing it.  Let pain be a guide, keeping in mind that you just had surgery. You will be given home exercises to be done on a daily basis. After the initial post-operative phase, we will gradually progress your activities. However, initially, it is extremely important that you exercise your new joint by walking. Remember that exercise and activity is important to prevent the formation of blood clots.      Assist devices    You will be discharged from the hospital with a walker, crutches or a cane depending on how well you walk with physical therapy as an inpatient. You will typically use these aids anywhere from a few days to a few weeks and stop using them when you are stable and strong on your feet.  Physical therapy will help you with this process.  Some people who have used these devices for years may require prolonged use for reasons unrelated to the surgery.      Dental work after joint replacement    Artificial joints can become infected after simple procedures such as dental cleaning. You should not have elective dental work performed for 3 months following your joint replacement due  to the increased risk of infection. If a dental crisis occurs within this time period, please call our office for instructions.      Post-operative office appointment    You have a follow up scheduled with Dr. Bowman's team in the office.  If you have any questions, please call the office: [phone]        Call the office if you notice any of the following:    Fever above 101° Fahrenheit    Persistent swelling, redness, or uncontrolled pain in the surgical area    Persistent bleeding or drainage from the wound    Severe calf pain or tenderness    If you are having a “medical” emergency (chest pain, shortness of breath, etc), call 911 and go to your nearest emergency department.      Jake Bowman MD   [facility]

## 2025-01-16 NOTE — OR NURSING
1410- Damaso PT working with the patient.    1455- Assisted the patient to the bathroom to void. Provided the patient a walker adjusted to the appropriate height.

## 2025-01-16 NOTE — OR NURSING
1232 assumed pt care.     1245 pt c/o 910 pain to left knee, PRN analgesic given as ordered. Left knee dressing CDI, ice pack in placed.     1300 pt given sips of water tolerating well.    1315 pt cont to c/o 9/10 pain on the left knee, PRN analgesic given as ordered.     1330 Given IS and instructed on use with goal of 3000 ml, pt currently inhaling volumes of approximately 2800 ml - 3000 ml.     1345 pt ambulated to the bathroom w/ FWW w/out any difficulty.    1402 pt meets criteria for stage 2 .

## 2025-01-16 NOTE — ANESTHESIA TIME REPORT
Anesthesia Start and Stop Event Times       Date Time Event    1/16/2025 0754 Ready for Procedure     0951 Anesthesia Start     1200 Anesthesia Stop          Responsible Staff  01/16/25      Name Role Begin End    Pastor Lacey M.D. Anesth 0951 1200          Overtime Reason:  no overtime (within assigned shift)    Comments:

## 2025-01-16 NOTE — LETTER
December 12, 2024    Patient Name: Alberto Bowles  Surgeon Name: Jake Bowman M.D.  Surgery Facility: [facility] ([address] [facility])  Surgery Date: 1/16/2025    The time of your surgery is not final and may change up to and until the day of your surgery. You will be contacted 24-48 hours prior to your surgery date with your check-in and surgery time.    If you will not be at one of the below numbers please call the surgery scheduler at [phone]  Preferred Phone: [phone]    BEFORE YOUR SURGERY   Pre Registration and/or Lab Work must be done within and no earlier than 28 days prior to your surgery date. Your scheduled facility will contact you regarding all required preregistration and/or lab work. If you have not been contacted within 7 days of your scheduled procedure please call [facility] at [phone] for an appointment as soon as possible.    Pre op Appointment:   Date: 1/8/25   Time: 9:15AM   Provider: Stefan Bowman MD    Location: [address] [place] [id number]  Instructions: Bring a list of all medications you are taking including the dosing and frequency.    - Please  your non-narcotic prescriptions prior to surgery at the pharmacy you provided us. DON'T START them until after your surgery.    DAY OF YOUR SURGERY  Nothing to eat or drink after midnight     Refrain from smoking any substance after midnight prior to surgery. Smoking may interfere with the anesthetic and frequently produces nausea during the recovery period.    Continue taking all lifesaving medications. Including the morning of your surgery with small sip of water.    Please do NOT take on the day of surgery:  Diuretics: examples- furosemide (Lasix), spironolactone, hydrochlorothiazide  ACE-inhibitors: examples- lisinopril, ramipril, enalapril  “ARBs”: examples- losartan, Olmesartan, valsartan    Please arrive at the hospital/surgery  center at the check-in time provided.     An adult will need to bring you and take you home after your surgery.     AFTER YOUR SURGERY  Post op Appointment:   Date: 1/29/25   Time: 10:15AM   With: Stefan Bowman MD    Location: [facility] [place] [address]    - Therapy- Your first appointment should be 4-5  day(s) after your surgery. For your convenience we have 4 Physical Therapy locations: [place], [facility], [place], and [facility]. Call our office ASAP to schedule an appointment at [phone] or take the enclosed Therapy Prescription to a facility of your choice.  - No dental work for 3-6 months after your surgery.  - You must have someone provide transportation post surgery and someone to monitor you for at least 24 hours post-surgery. If you don't have either of these your appointment will be canceled.     TIME OFF WORK  FMLA or Disability forms can be faxed directly to: [phone] or you may drop them off at [facility] [address] [place] [address]. Our office charges a $35.00 fee per form. Forms will be completed within 10 business days of drop off and payment received. For the status of your forms you may contact our disability office directly [phone].    MEDICATION INSTRUCTIONS **Please read section completely**  The following medications should be stopped a minimum of 10 days prior to surgery:  All over the counter, Supplements & Herbal medications    Anorectics: Phentermine (Adipex-P, Lomaira and Suprenza), Phentermine-topiramate (Qsymia), Bupropion-naltrexone (Contrave)    **If you are on Bupropion for anxiety/depression, please continue this medication up until the day of surgery.     Opiod Partial Agonists/Opioid Antagonists: Buprenorphine (Suboxone, Belbuca, Butrans, Probuphine Implant, Sublocade), Naltrexone (ReVia, Vivitrol), Naloxone    Amphetamines: Dextroamphetamine/Amphetamine (Adderall, Mydayis), Methylphenidate Hydrochloride (Concerta, Metadate, Methylin,  Ritalin)    The following medications should be stopped 5 days prior to surgery:  Blood Thinners: Any Aspirin, Aspirin products, anti-inflammatories such as ibuprofen and any blood thinners such as Coumadin and Plavix. Please consult your prescribing physician if you are on life saving blood thinners, in regards to when to stop medications prior to surgery.     The following medications should be stopped a minimum of 3 days prior to surgery:  PDE-5 inhibitors: Sildenafil (Viagra), Tadalafil (Cialis), Vardenafil (Levitra), Avanafil (Stendra)    MAO Inhibitors: Rasagiline (Azilect), Selegiline (Eldepryl, Emsam, Selapar), Isocarboxazid (Marplan), Phenelzine (Nardil)

## 2025-01-16 NOTE — DISCHARGE INSTRUCTIONS
See Dr Bowman Discharge instructions:      If any questions arise, call your provider.  If your provider is not available, please feel free to call the Surgical Center at [phone].    MEDICATIONS: Resume taking daily medication.  Take prescribed pain medication with food.  If no medication is prescribed, you may take non-aspirin pain medication if needed.  PAIN MEDICATION CAN BE VERY CONSTIPATING.  Take a stool softener or laxative such as senokot, pericolace, or milk of magnesia if needed.    Last pain medication ___________________ given at ____________________        What to Expect Post Anesthesia    Rest and take it easy for the first 24 hours.  A responsible adult is recommended to remain with you during that time.  It is normal to feel sleepy.  We encourage you to not do anything that requires balance, judgment or coordination.    FOR 24 HOURS DO NOT:  Drive, operate machinery or run household appliances.  Drink beer or alcoholic beverages.  Make important decisions or sign legal documents.    To avoid nausea, slowly advance diet as tolerated, avoiding spicy or greasy foods for the first day.  Add more substantial food to your diet according to your provider's instructions.  INCREASE FLUIDS AND FIBER TO AVOID CONSTIPATION.    MILD FLU-LIKE SYMPTOMS ARE NORMAL.  YOU MAY EXPERIENCE GENERALIZED MUSCLE ACHES, THROAT IRRITATION, HEADACHE AND/OR SOME NAUSEA.

## 2025-01-16 NOTE — INTERVAL H&P NOTE
Consented Procedure: ROBOTIC LEFT TOTAL KNEE ARTHROPLASTY  I have examined the patient, provided the risks, benefits, and alternatives to the procedure(s) indicated on the signed consent form, and the patient wishes to proceed.    The risks and benefits of surgery were discussed in detail, including but not limited to the risks of infection, bleeding, nerve or blood vessel injury, deep venous thrombosis, pulmonary embolism, arthrofibrosis, reflex sympathetic dystrophy, death, paralysis, knee or patella dislocation, extensor mechanism injury, bone fracture, and component loosening or failure, which could require re-operation or amputation. Informed consent will be obtained, and the patient will be scheduled for surgery. We also discussed fixation strategies, including cement and cementless options, as well as the advantages and disadvantages of each. Details of the surgery and rehabilitation were also covered.     All questions were answered, and the patient has chosen to proceed with surgery.    H&P reviewed. The patient was examined and there are no changes to the H&P      Jake Bowman M.D.  01/16/25 9:21 AM

## 2025-02-04 SDOH — ECONOMIC STABILITY: INCOME INSECURITY: IN THE LAST 12 MONTHS, WAS THERE A TIME WHEN YOU WERE NOT ABLE TO PAY THE MORTGAGE OR RENT ON TIME?: NO

## 2025-02-04 SDOH — HEALTH STABILITY: PHYSICAL HEALTH: ON AVERAGE, HOW MANY MINUTES DO YOU ENGAGE IN EXERCISE AT THIS LEVEL?: 0 MIN

## 2025-02-04 SDOH — ECONOMIC STABILITY: INCOME INSECURITY: HOW HARD IS IT FOR YOU TO PAY FOR THE VERY BASICS LIKE FOOD, HOUSING, MEDICAL CARE, AND HEATING?: NOT HARD AT ALL

## 2025-02-04 SDOH — ECONOMIC STABILITY: FOOD INSECURITY: WITHIN THE PAST 12 MONTHS, YOU WORRIED THAT YOUR FOOD WOULD RUN OUT BEFORE YOU GOT MONEY TO BUY MORE.: NEVER TRUE

## 2025-02-04 SDOH — ECONOMIC STABILITY: FOOD INSECURITY: WITHIN THE PAST 12 MONTHS, THE FOOD YOU BOUGHT JUST DIDN'T LAST AND YOU DIDN'T HAVE MONEY TO GET MORE.: NEVER TRUE

## 2025-02-04 SDOH — HEALTH STABILITY: PHYSICAL HEALTH: ON AVERAGE, HOW MANY DAYS PER WEEK DO YOU ENGAGE IN MODERATE TO STRENUOUS EXERCISE (LIKE A BRISK WALK)?: 0 DAYS

## 2025-02-04 SDOH — HEALTH STABILITY: MENTAL HEALTH
STRESS IS WHEN SOMEONE FEELS TENSE, NERVOUS, ANXIOUS, OR CAN'T SLEEP AT NIGHT BECAUSE THEIR MIND IS TROUBLED. HOW STRESSED ARE YOU?: ONLY A LITTLE

## 2025-02-04 ASSESSMENT — SOCIAL DETERMINANTS OF HEALTH (SDOH)
HOW OFTEN DO YOU ATTENT MEETINGS OF THE CLUB OR ORGANIZATION YOU BELONG TO?: NEVER
HOW OFTEN DO YOU HAVE A DRINK CONTAINING ALCOHOL: 2-3 TIMES A WEEK
HOW HARD IS IT FOR YOU TO PAY FOR THE VERY BASICS LIKE FOOD, HOUSING, MEDICAL CARE, AND HEATING?: NOT HARD AT ALL
HOW MANY DRINKS CONTAINING ALCOHOL DO YOU HAVE ON A TYPICAL DAY WHEN YOU ARE DRINKING: 3 OR 4
HOW OFTEN DO YOU GET TOGETHER WITH FRIENDS OR RELATIVES?: TWICE A WEEK
DO YOU BELONG TO ANY CLUBS OR ORGANIZATIONS SUCH AS CHURCH GROUPS UNIONS, FRATERNAL OR ATHLETIC GROUPS, OR SCHOOL GROUPS?: NO
IN A TYPICAL WEEK, HOW MANY TIMES DO YOU TALK ON THE PHONE WITH FAMILY, FRIENDS, OR NEIGHBORS?: MORE THAN THREE TIMES A WEEK
WITHIN THE PAST 12 MONTHS, YOU WORRIED THAT YOUR FOOD WOULD RUN OUT BEFORE YOU GOT THE MONEY TO BUY MORE: NEVER TRUE
HOW OFTEN DO YOU ATTEND CHURCH OR RELIGIOUS SERVICES?: NEVER
DO YOU BELONG TO ANY CLUBS OR ORGANIZATIONS SUCH AS CHURCH GROUPS UNIONS, FRATERNAL OR ATHLETIC GROUPS, OR SCHOOL GROUPS?: NO
IN THE PAST 12 MONTHS, HAS THE ELECTRIC, GAS, OIL, OR WATER COMPANY THREATENED TO SHUT OFF SERVICE IN YOUR HOME?: NO
IN A TYPICAL WEEK, HOW MANY TIMES DO YOU TALK ON THE PHONE WITH FAMILY, FRIENDS, OR NEIGHBORS?: MORE THAN THREE TIMES A WEEK
HOW OFTEN DO YOU ATTEND CHURCH OR RELIGIOUS SERVICES?: NEVER
HOW OFTEN DO YOU HAVE SIX OR MORE DRINKS ON ONE OCCASION: LESS THAN MONTHLY
HOW OFTEN DO YOU GET TOGETHER WITH FRIENDS OR RELATIVES?: TWICE A WEEK
HOW OFTEN DO YOU ATTENT MEETINGS OF THE CLUB OR ORGANIZATION YOU BELONG TO?: NEVER

## 2025-02-04 ASSESSMENT — LIFESTYLE VARIABLES
HOW OFTEN DO YOU HAVE SIX OR MORE DRINKS ON ONE OCCASION: LESS THAN MONTHLY
HOW MANY STANDARD DRINKS CONTAINING ALCOHOL DO YOU HAVE ON A TYPICAL DAY: 3 OR 4
SKIP TO QUESTIONS 9-10: 0
HOW OFTEN DO YOU HAVE A DRINK CONTAINING ALCOHOL: 2-3 TIMES A WEEK
AUDIT-C TOTAL SCORE: 5

## 2025-02-05 ENCOUNTER — OFFICE VISIT (OUTPATIENT)
Dept: MEDICAL GROUP | Facility: PHYSICIAN GROUP | Age: 70
End: 2025-02-05
Payer: MEDICARE

## 2025-02-05 VITALS
BODY MASS INDEX: 27.17 KG/M2 | WEIGHT: 189.8 LBS | TEMPERATURE: 97.7 F | HEIGHT: 70 IN | HEART RATE: 92 BPM | DIASTOLIC BLOOD PRESSURE: 68 MMHG | OXYGEN SATURATION: 100 % | SYSTOLIC BLOOD PRESSURE: 132 MMHG

## 2025-02-05 DIAGNOSIS — K44.9 HIATAL HERNIA: ICD-10-CM

## 2025-02-05 DIAGNOSIS — Z85.89 HX OF SQUAMOUS CELL CARCINOMA: ICD-10-CM

## 2025-02-05 DIAGNOSIS — E78.00 HYPERCHOLESTEROLEMIA: ICD-10-CM

## 2025-02-05 DIAGNOSIS — Z12.11 COLON CANCER SCREENING: ICD-10-CM

## 2025-02-05 DIAGNOSIS — F10.90 ALCOHOL USE DISORDER: ICD-10-CM

## 2025-02-05 DIAGNOSIS — E66.3 OVERWEIGHT (BMI 25.0-29.9): ICD-10-CM

## 2025-02-05 DIAGNOSIS — I10 PRIMARY HYPERTENSION: ICD-10-CM

## 2025-02-05 DIAGNOSIS — M17.12 PRIMARY OSTEOARTHRITIS OF LEFT KNEE: ICD-10-CM

## 2025-02-05 DIAGNOSIS — Z00.00 HEALTHCARE MAINTENANCE: ICD-10-CM

## 2025-02-05 DIAGNOSIS — K21.9 GASTROESOPHAGEAL REFLUX DISEASE WITHOUT ESOPHAGITIS: ICD-10-CM

## 2025-02-05 DIAGNOSIS — Z11.59 NEED FOR HEPATITIS C SCREENING TEST: ICD-10-CM

## 2025-02-05 DIAGNOSIS — Z11.3 SCREEN FOR SEXUALLY TRANSMITTED DISEASES: ICD-10-CM

## 2025-02-05 DIAGNOSIS — R03.0 ELEVATED BLOOD PRESSURE READING IN OFFICE WITHOUT DIAGNOSIS OF HYPERTENSION: ICD-10-CM

## 2025-02-05 DIAGNOSIS — Z12.5 ENCOUNTER FOR SCREENING FOR MALIGNANT NEOPLASM OF PROSTATE: ICD-10-CM

## 2025-02-05 PROCEDURE — 3078F DIAST BP <80 MM HG: CPT | Performed by: FAMILY MEDICINE

## 2025-02-05 PROCEDURE — 3075F SYST BP GE 130 - 139MM HG: CPT | Performed by: FAMILY MEDICINE

## 2025-02-05 PROCEDURE — 99214 OFFICE O/P EST MOD 30 MIN: CPT | Performed by: FAMILY MEDICINE

## 2025-02-05 ASSESSMENT — PATIENT HEALTH QUESTIONNAIRE - PHQ9: CLINICAL INTERPRETATION OF PHQ2 SCORE: 0

## 2025-02-05 ASSESSMENT — FIBROSIS 4 INDEX: FIB4 SCORE: 1.23

## 2025-02-05 NOTE — PROGRESS NOTES
Verbal consent was acquired by the patient to use [employer] ambient listening note generation during this visit.    Subjective:     HPI:   History of Present Illness  The patient presents for evaluation of left knee pain, hypertension, skin lesions, and health maintenance.    The patient underwent a left knee arthroplasty approximately 24 days ago, followed by persistent edema extending to the ankle. He reports pain localized to the anterior aspect of the knee, radiating distally to the foot, and posterior discomfort described as a bruised sensation. He denies any history of deep venous thrombosis (DVT) or pulmonary embolism. His current pharmacological regimen includes low-dose aspirin (81 mg), acetaminophen, cyclobenzaprine as needed, meloxicam, hydrocodone, and oxycodone. Additionally, he underwent a neurotomy of the left knee performed by Dr. Rolle, a pain management specialist. Previous interventions with lidocaine, corticosteroid injections, and hyaluronic acid were ineffective. He has a history of osteoarthritis with significant cartilage loss in both knees, with the right knee being less symptomatic and only occasionally painful.    The patient has a history of laryngeal carcinoma treated with extensive radiation therapy, which he suspects may have impacted his thyroid function. He has not undergone a thyroid ultrasound. He has a history of cutaneous malignancies, including skin cancer on the abdomen and foot, both excised by Dr. Paulson. He has not yet undergone colorectal cancer screening. His medical history includes a hiatal hernia and gastroesophageal reflux disease (GERD), which have largely resolved. He underwent an upper gastrointestinal series and was considered for surgical intervention, but it was determined that only one surgeon in [facility] could perform the procedure. He currently does not experience acid reflux. His surgical history includes appendectomy and tonsillectomy. He has  "bilateral cataracts and plans to undergo cataract extraction surgery. He maintains regular dental check-ups biannually and annual ophthalmologic examinations.    The patient has a history of chronic back pain with swollen and herniated intervertebral discs, managed by Dr. Fuller, who advised against surgical intervention and suggested a morphine implant.    He reports the sudden appearance of a small mole, initially suspected to be a boil or infection. A biopsy confirmed it to be squamous cell carcinoma, which was subsequently excised. He also has a lesion on his foot, biopsied and diagnosed as squamous cell carcinoma. He does not regularly consult a dermatologist. He has noticed a new lesion over the past 2 months, resembling skin tags, which he frequently scratches off.    The patient reports fluctuating blood pressure readings, with recent measurements ranging from hypertensive to normotensive, the most recent being 117/72 mmHg.    SOCIAL HISTORY  The patient admits to smoking less than a pack a day before surgery and has been smoking for 50 years. The patient drinks light beer, consuming about eight beers on Sundays and occasionally a couple of beers in the afternoon. The patient does not drink hard liquor. The patient does not use any recreational drugs.    FAMILY HISTORY  The patient's mother had squamous cell carcinoma in her sinuses. The patient's sister had melanoma. The patient's father and two brothers have hypertension. The patient reports no family history of heart disease, stroke, colorectal cancer, or prostate cancer.    MEDICATIONS  Current: Tylenol, aspirin, Flexeril, meloxicam, hydrocodone, oxycodone    IMMUNIZATIONS  The patient received a tetanus shot about a year and a half ago.    Health Maintenance: Completed    Objective:     Exam:  /68 (BP Location: Right arm, Patient Position: Sitting, BP Cuff Size: Adult)   Pulse 92   Temp 36.5 °C (97.7 °F) (Temporal)   Ht 1.778 m (5' 10\")   Wt " 86.1 kg (189 lb 12.8 oz)   SpO2 100%   BMI 27.23 kg/m²  Body mass index is 27.23 kg/m².    Physical Exam  Vitals reviewed.   Constitutional:       Appearance: Normal appearance.   HENT:      Head: Normocephalic and atraumatic.      Nose: Nose normal.   Cardiovascular:      Rate and Rhythm: Normal rate and regular rhythm.      Pulses: Normal pulses.      Heart sounds: Normal heart sounds. No murmur heard.  Pulmonary:      Effort: Pulmonary effort is normal.      Breath sounds: Normal breath sounds.   Abdominal:      General: Abdomen is flat.      Palpations: Abdomen is soft.   Skin:     General: Skin is warm and dry.   Neurological:      Mental Status: He is alert.   Psychiatric:         Mood and Affect: Mood normal.         Behavior: Behavior normal.       Edema present in the left lower extremity.  Previous incision clean dry and intact.      Results  Laboratory Studies  Glucose was 103.    Assessment & Plan:     1. Elevated blood pressure reading in office without diagnosis of hypertension        2. Primary hypertension        3. Hypercholesterolemia        4. Overweight (BMI 25.0-29.9)        5. Primary osteoarthritis of left knee        6. Alcohol use disorder        7. Hiatal hernia        8. Gastroesophageal reflux disease without esophagitis        9. Need for hepatitis C screening test  HEP C VIRUS ANTIBODY      10. Screen for sexually transmitted diseases  HIV AG/AB COMBO ASSAY SCREENING      11. Healthcare maintenance  TSH WITH REFLEX TO FT4    HEMOGLOBIN A1C    Lipid Profile    HIV AG/AB COMBO ASSAY SCREENING    HEP C VIRUS ANTIBODY    Comp Metabolic Panel    PROSTATE SPECIFIC AG SCREENING    Referral to GI for Colonoscopy      12. Encounter for screening for malignant neoplasm of prostate  PROSTATE SPECIFIC AG SCREENING      13. Colon cancer screening  Referral to GI for Colonoscopy      14. Hx of squamous cell carcinoma  Referral to Dermatology          Assessment & Plan  1. Post-operative status  following left knee replacement.  The swelling in his left knee is anticipated given the recent surgical intervention. He is currently on baby aspirin 81 mg daily. He has been advised to monitor for signs of deep venous thrombosis (DVT) such as increased swelling, redness, or warmth in the calf. If these symptoms occur, he should seek immediate medical attention.    2. Hypertension.  His blood pressure readings today are within the normal range. He will continue to monitor his blood pressure regularly.    3. Seborrheic keratosis.  The lesion on his skin appears to be seborrheic keratosis, which does not raise immediate concern. A referral to dermatology has been made for further evaluation and potential biopsy of the lesion. He is advised to see a dermatologist once or twice a year due to his history of skin cancer and family history of melanoma.    4. Health maintenance.  A prostate-specific antigen (PSA) test will be conducted to screen for prostate cancer. A colonoscopy has been ordered for colorectal cancer screening. He has declined the Cologuard test. He has also declined the influenza, pneumonia, shingles, and hepatitis A vaccines at this time.    PROCEDURE  The patient underwent a left knee replacement approximately 24 days ago. He also had a neurotomy of the left knee performed by Dr. Rolle. Previous treatments with lidocaine, cortisone, and hyaluronic acid were ineffective. The patient has a history of appendectomy and tonsillectomy.        Return in about 4 weeks (around 3/5/2025) for F/u labs.    Please note that this dictation was created using voice recognition software. I have made every reasonable attempt to correct obvious errors, but I expect that there are errors of grammar and possibly content that I did not discover before finalizing the note.    Lilian Gloria MD  Family Medicine and Non - Operative Sports Medicine   [facility]

## 2025-02-05 NOTE — LETTER
Critical access hospital  Lilian Gloria M.D.  [address]  [place] [address]  Fax: [phone]   Authorization for Release/Disclosure of   Protected Health Information   Name: ALBERTO MCELROY : 1955 SSN: [id number]   Address: [address] [place] [address] [address]  [place] [address] Phone:    There are no phone numbers on file.   I authorize the entity listed below to release/disclose the PHI below to:   Critical access hospital/Lilian Gloria M.D. and Lilian Gloria M.D.   Provider or Entity Name:     Address   City, State, Zip   Phone:      Fax:     Reason for request: continuity of care   Information to be released:    [  ] LAST COLONOSCOPY,  including any PATH REPORT and follow-up  [  ] LAST FIT/COLOGUARD RESULT [  ] LAST DEXA  [  ] LAST MAMMOGRAM  [  ] LAST PAP  [  ] LAST LABS [  ] RETINA EXAM REPORT  [  ] IMMUNIZATION RECORDS  [  ] Release all info      [  ] Check here and initial the line next to each item to release ALL health information INCLUDING  _____ Care and treatment for drug and / or alcohol abuse  _____ HIV testing, infection status, or AIDS  _____ Genetic Testing    DATES OF SERVICE OR TIME PERIOD TO BE DISCLOSED: _____________  I understand and acknowledge that:  * This Authorization may be revoked at any time by you in writing, except if your health information has already been used or disclosed.  * Your health information that will be used or disclosed as a result of you signing this authorization could be re-disclosed by the recipient. If this occurs, your re-disclosed health information may no longer be protected by State or Federal laws.  * You may refuse to sign this Authorization. Your refusal will not affect your ability to obtain treatment.  * This Authorization becomes effective upon signing and will  on (date) __________.      If no date is indicated, this Authorization will  one (1) year from the signature date.    Name: Alberto Mcelroy  Signature: Date:   2025     PLEASE FAX  REQUESTED RECORDS BACK TO: [phone]

## 2025-02-25 ENCOUNTER — OFFICE VISIT (OUTPATIENT)
Dept: DERMATOLOGY | Facility: IMAGING CENTER | Age: 70
End: 2025-02-25
Payer: MEDICARE

## 2025-02-25 DIAGNOSIS — D22.9 MULTIPLE BENIGN NEVI: ICD-10-CM

## 2025-02-25 DIAGNOSIS — D18.01 CHERRY ANGIOMA: ICD-10-CM

## 2025-02-25 DIAGNOSIS — Z12.83 SKIN CANCER SCREENING: ICD-10-CM

## 2025-02-25 DIAGNOSIS — L82.1 SEBORRHEIC KERATOSES: ICD-10-CM

## 2025-02-25 DIAGNOSIS — D48.5 NEOPLASM OF UNCERTAIN BEHAVIOR OF SKIN: ICD-10-CM

## 2025-02-25 DIAGNOSIS — Z85.828 HISTORY OF SKIN CANCER: ICD-10-CM

## 2025-02-25 DIAGNOSIS — L81.4 LENTIGO: ICD-10-CM

## 2025-02-25 PROBLEM — L98.9 SKIN LESION: Status: RESOLVED | Noted: 2024-01-11 | Resolved: 2025-02-25

## 2025-02-25 RX ORDER — CEPHALEXIN 500 MG/1
CAPSULE ORAL
Qty: 4 CAPSULE | Refills: 0 | Status: SHIPPED | OUTPATIENT
Start: 2025-02-25

## 2025-02-25 NOTE — PROGRESS NOTES
[facility] DERMATOLOGY CLINIC NOTE    Chief Complaint   Patient presents with    Establish Care     Pt declined FULL skin exam. Would just like an upper body exam.  Hx of SCC lt leg, rt chest    Skin Lesion     Rt upper chest        HPI:    Alberto Bowles is a 69 y.o. male here for evaluation of above, ERICKA.     He notes history of SCC on his chest a year or two ago. Has not had full skin check since. Requests upper body exam today only. He had knee replacement 5 weeks ago at [facility], healing well.   Notes few scaly spots on chest, arms.   No other symptomatic (itching, painful, burning) or changing lesions.       Dermatology History:      - SCC right upper abdomen s/p WLE        Review of Systems: No fevers, chill. Pertinent positives and negatives above.       Medications, Medical History, Surgical History, Family History & Allergies:  Reviewed in the chart, relevant history noted above.         PHYSICAL EXAM, ASSESSMENT, & PLAN (per problem):   An upper body skin exam was performed of the head (including face), neck, chest, abdomen, back, upper extremities with the following pertinent findings in assessment/plan.       Neoplasm of uncertain significance, upper back  Exam: dark brown macule with angulated lines, ddx includes MIS vs lentigo vs severely dysplastic vs other      - discussed options today, proceed with biopsy. Further treatment may be recommended   - given recent knee replacement 5 weeks ago -- sent Keflex 2g x 1 dose prophylactic      Shave Biopsy Procedure Note:   Risks, benefits and alternatives of procedure discussed, verbal consent obtained for photo (see chart) and informed consent obtained for procedure. Time out completed. Area of biopsy prepped with alcohol. Anesthesia with 1% lidocaine with epinephrine administered intradermally with 30 gauge needle. Shave biopsy of the site performed. Hemostasis achieved with pressure and aluminum chloride. Vaseline applied to wound with bandage. Patient  tolerated procedure well and there were no complications. Wound care was discussed. Instructed to call clinic if patient experiences any complications such as post-operative bleeding, infection.        Benign appearing melanocytic nevi   Exam: pink soft papule/s on back and chest consistent with IDN     Discussed nevi are benign appearing on exam, symmetrical and regular pigment network on dermoscopy   ABCDEs of melanoma discussed, call office for sooner evaluation for concerning changes/growth      Lentigos / lentigines   Exam: scattered light to medium brown macules over photodistributed areas of face, upper shoulders, arms     benign, reassurance   ABCDEs of melanoma discussed       Seborrheic keratosis   Exam: scattered tan to brown verrucous papules and plaques on trunk and extremities     benign, reassurance       Cherry Angiomas / telangiectasias   Exam: scattered 1-3mm bright red macules and thin papules on trunk and extremities     benign, reassurance   Provided recs for outside dermatologist      History of Skin Cancer   Exam: Well healed scar/s on right inferior chest, no evidence of recurrence     - advise regular sun protection/sunscreen use, SPF 30 or greater with broad spectrum coverage need for reapplication every  minutes.   - recommend broad brimmed hats, UPF sun protective clothing when outdoors for extended periods of time   - recommend self checks at home,  ABCDEs of melanoma discussed           Follow up: Return in about 6 months (around 8/25/2025) for ERICKA - sooner pending biopsy results .        Jael Santana MD   [facility]

## 2025-03-04 ENCOUNTER — TELEPHONE (OUTPATIENT)
Dept: DERMATOLOGY | Facility: IMAGING CENTER | Age: 70
End: 2025-03-04
Payer: MEDICARE

## 2025-03-04 ENCOUNTER — RESULTS FOLLOW-UP (OUTPATIENT)
Dept: DERMATOLOGY | Facility: IMAGING CENTER | Age: 70
End: 2025-03-04

## 2025-03-04 DIAGNOSIS — C43.59 MALIGNANT MELANOMA OF TORSO EXCLUDING BREAST (HCC): ICD-10-CM

## 2025-03-04 RX ORDER — CEPHALEXIN 500 MG/1
2000 CAPSULE ORAL ONCE
Qty: 4 CAPSULE | Refills: 0 | Status: SHIPPED | OUTPATIENT
Start: 2025-03-04 | End: 2025-03-04

## 2025-03-04 NOTE — TELEPHONE ENCOUNTER
Called patient to inform of results.     A.) back - invasive malignant melanoma, breslow depth 0.5mm, stage pT1a     Discussed diagnosis of invasive melanoma. At his breslow depth, this is considered stage 1. Discussed that at this stage, standard of care treatment involves wide local excision. SLNB, imaging, chemo and radiation are considered for more advanced stages. Pt amenable to treatment.     Will proceed with wide local excision in clinic. Prior auth form completed. Also recommend skin checks every 3 months for first year following diagnosis of melanoma.     Had total knee replacement ~6 weeks ago, will send keflex 2g to take PO prior to surgery. Patient aware to bring Rx to appointment prior to procedure.     Our office staff will reach out to call to schedule procedure. Thankful for call and had no further questions.

## 2025-03-05 ENCOUNTER — HOSPITAL ENCOUNTER (OUTPATIENT)
Dept: LAB | Facility: MEDICAL CENTER | Age: 70
End: 2025-03-05
Attending: FAMILY MEDICINE
Payer: MEDICARE

## 2025-03-05 DIAGNOSIS — Z00.00 HEALTHCARE MAINTENANCE: ICD-10-CM

## 2025-03-05 DIAGNOSIS — Z12.5 ENCOUNTER FOR SCREENING FOR MALIGNANT NEOPLASM OF PROSTATE: ICD-10-CM

## 2025-03-05 DIAGNOSIS — Z11.3 SCREEN FOR SEXUALLY TRANSMITTED DISEASES: ICD-10-CM

## 2025-03-05 DIAGNOSIS — Z11.59 NEED FOR HEPATITIS C SCREENING TEST: ICD-10-CM

## 2025-03-05 LAB
ALBUMIN SERPL BCP-MCNC: 4.1 G/DL (ref 3.2–4.9)
ALBUMIN/GLOB SERPL: 1.4 G/DL
ALP SERPL-CCNC: 76 U/L (ref 30–99)
ALT SERPL-CCNC: 17 U/L (ref 2–50)
ANION GAP SERPL CALC-SCNC: 11 MMOL/L (ref 7–16)
AST SERPL-CCNC: 18 U/L (ref 12–45)
BILIRUB SERPL-MCNC: 0.5 MG/DL (ref 0.1–1.5)
BUN SERPL-MCNC: 10 MG/DL (ref 8–22)
CALCIUM ALBUM COR SERPL-MCNC: 9.6 MG/DL (ref 8.5–10.5)
CALCIUM SERPL-MCNC: 9.7 MG/DL (ref 8.5–10.5)
CHLORIDE SERPL-SCNC: 103 MMOL/L (ref 96–112)
CHOLEST SERPL-MCNC: 232 MG/DL (ref 100–199)
CO2 SERPL-SCNC: 26 MMOL/L (ref 20–33)
CREAT SERPL-MCNC: 0.83 MG/DL (ref 0.5–1.4)
EST. AVERAGE GLUCOSE BLD GHB EST-MCNC: 114 MG/DL
GFR SERPLBLD CREATININE-BSD FMLA CKD-EPI: 95 ML/MIN/1.73 M 2
GLOBULIN SER CALC-MCNC: 2.9 G/DL (ref 1.9–3.5)
GLUCOSE SERPL-MCNC: 107 MG/DL (ref 65–99)
HBA1C MFR BLD: 5.6 % (ref 4–5.6)
HDLC SERPL-MCNC: 46 MG/DL
LDLC SERPL CALC-MCNC: 143 MG/DL
POTASSIUM SERPL-SCNC: 4.4 MMOL/L (ref 3.6–5.5)
PROT SERPL-MCNC: 7 G/DL (ref 6–8.2)
PSA SERPL DL<=0.01 NG/ML-MCNC: 3.3 NG/ML (ref 0–4)
SODIUM SERPL-SCNC: 140 MMOL/L (ref 135–145)
TRIGL SERPL-MCNC: 214 MG/DL (ref 0–149)
TSH SERPL DL<=0.005 MIU/L-ACNC: 4.98 UIU/ML (ref 0.38–5.33)

## 2025-03-05 PROCEDURE — 80053 COMPREHEN METABOLIC PANEL: CPT

## 2025-03-05 PROCEDURE — 83036 HEMOGLOBIN GLYCOSYLATED A1C: CPT

## 2025-03-05 PROCEDURE — 84443 ASSAY THYROID STIM HORMONE: CPT

## 2025-03-05 PROCEDURE — 87389 HIV-1 AG W/HIV-1&-2 AB AG IA: CPT

## 2025-03-05 PROCEDURE — 80061 LIPID PANEL: CPT

## 2025-03-05 PROCEDURE — 84153 ASSAY OF PSA TOTAL: CPT

## 2025-03-05 PROCEDURE — 86803 HEPATITIS C AB TEST: CPT

## 2025-03-05 PROCEDURE — 36415 COLL VENOUS BLD VENIPUNCTURE: CPT

## 2025-03-06 ENCOUNTER — RESULTS FOLLOW-UP (OUTPATIENT)
Dept: MEDICAL GROUP | Facility: PHYSICIAN GROUP | Age: 70
End: 2025-03-06
Payer: MEDICARE

## 2025-03-06 LAB
HCV AB SER QL: NORMAL
HIV 1+2 AB+HIV1 P24 AG SERPL QL IA: NORMAL

## 2025-03-07 ENCOUNTER — APPOINTMENT (OUTPATIENT)
Dept: LAB | Facility: MEDICAL CENTER | Age: 70
End: 2025-03-07
Payer: MEDICARE

## 2025-03-12 ENCOUNTER — OFFICE VISIT (OUTPATIENT)
Dept: DERMATOLOGY | Facility: IMAGING CENTER | Age: 70
End: 2025-03-12
Payer: MEDICARE

## 2025-03-12 VITALS
TEMPERATURE: 97.9 F | WEIGHT: 185 LBS | DIASTOLIC BLOOD PRESSURE: 76 MMHG | HEIGHT: 70 IN | BODY MASS INDEX: 26.48 KG/M2 | SYSTOLIC BLOOD PRESSURE: 124 MMHG

## 2025-03-12 DIAGNOSIS — C43.59 MALIGNANT MELANOMA OF TORSO EXCLUDING BREAST (HCC): ICD-10-CM

## 2025-03-12 PROCEDURE — 3078F DIAST BP <80 MM HG: CPT | Performed by: STUDENT IN AN ORGANIZED HEALTH CARE EDUCATION/TRAINING PROGRAM

## 2025-03-12 PROCEDURE — 3074F SYST BP LT 130 MM HG: CPT | Performed by: STUDENT IN AN ORGANIZED HEALTH CARE EDUCATION/TRAINING PROGRAM

## 2025-03-12 PROCEDURE — 12034 INTMD RPR S/TR/EXT 7.6-12.5: CPT | Performed by: STUDENT IN AN ORGANIZED HEALTH CARE EDUCATION/TRAINING PROGRAM

## 2025-03-12 PROCEDURE — 11603 EXC TR-EXT MAL+MARG 2.1-3 CM: CPT | Performed by: STUDENT IN AN ORGANIZED HEALTH CARE EDUCATION/TRAINING PROGRAM

## 2025-03-12 ASSESSMENT — FIBROSIS 4 INDEX: FIB4 SCORE: 1.42

## 2025-03-12 NOTE — PROGRESS NOTES
"PROCEDURE NOTE:    WIDE LOCAL EXCISION    After patient received diagnosis of dysplastic nevus with malignant melanoma, further management was discussed, including wide local excision vs radiation therapy vs other. Patient opted for wide local excision. Risks, benefits and alternatives of procedure, including, but not limited to scar, bleeding, pain, infection, nerve damage, recurrence of tumor, failed surgery, and need for further surgery, were discussed and written informed consent obtained. Correct site was verified by patient and myself, and verbal time out completed.     Allergies reviewed: Yes  Pacemaker/defibrillator: No  Artificial joints: Yes - pre-op keflex 30 min prior to procedure 2g  Antibiotics given: Yes  Blood thinners/anticoagulants: No    Pre-op diagnosis: malignant melanoma  Post-op diagnosis: Same  Indication: malignant melanoma   Site:Back  Pre-op size: 1cm + 10mm margin = 3 cm  Post-op Antibiotics: no (pre-op abx administered, see above)     /76 (BP Location: Left arm, Patient Position: Sitting, BP Cuff Size: Adult)   Temp 36.6 °C (97.9 °F) (Temporal)   Ht 1.778 m (5' 10\")   Wt 83.9 kg (185 lb)     Procedure: Area of surgery was prepped with alcohol, marked with 10mm margins, and with sterile marking pen. Anesthesia with 1% lidocaine with epinephrine administered with 30 gauge needle. The area was again cleaned with chlorhexidine swab. With sterile technique, a 10 blade scalpel was used to make an elliptical incision around the tumor to the level of the subcutaneous fat. The tumor was removed. Bleeding was minimal, and hemostasis was achieved with pressure, hyfrecation. Specimen was placed into biopsy container and sent to pathology by staff.    Intermediate Closure:  Buried vertical mattress sutures were placed with 3.0 monocryl to close dead space. 4.0 prolene superficial running sutures were placed to approximate wound edge.  Vaseline applied to wound with bandage. Patient tolerated " procedure well and there were no complications, blood loss was minimal.     Final wound size: 8.0 cm    Bandage was placed with vaseline, telfa, gauze and tape. Wound care was discussed with the patient, and written instructions were provided. Patient to return to clinic in 10-14 days for suture removal. Patient to call us if any problems or concerns with the procedure site arise prior to scheduled appointment.     Additional follow-up will be planned for 3 months for total skin exam    Jael Santana MD   Dermatology

## 2025-03-13 ENCOUNTER — OFFICE VISIT (OUTPATIENT)
Dept: MEDICAL GROUP | Facility: PHYSICIAN GROUP | Age: 70
End: 2025-03-13
Payer: MEDICARE

## 2025-03-13 VITALS
HEART RATE: 74 BPM | HEIGHT: 70 IN | OXYGEN SATURATION: 96 % | SYSTOLIC BLOOD PRESSURE: 134 MMHG | BODY MASS INDEX: 26.6 KG/M2 | DIASTOLIC BLOOD PRESSURE: 64 MMHG | TEMPERATURE: 97.7 F | WEIGHT: 185.8 LBS

## 2025-03-13 DIAGNOSIS — R73.01 IMPAIRED FASTING BLOOD SUGAR: ICD-10-CM

## 2025-03-13 DIAGNOSIS — Z78.9 STATIN INTOLERANCE: ICD-10-CM

## 2025-03-13 DIAGNOSIS — M79.10 MYALGIA DUE TO STATIN: ICD-10-CM

## 2025-03-13 DIAGNOSIS — E78.00 HYPERCHOLESTEROLEMIA: ICD-10-CM

## 2025-03-13 DIAGNOSIS — T46.6X5A MYALGIA DUE TO STATIN: ICD-10-CM

## 2025-03-13 PROCEDURE — 3075F SYST BP GE 130 - 139MM HG: CPT | Performed by: FAMILY MEDICINE

## 2025-03-13 PROCEDURE — 3078F DIAST BP <80 MM HG: CPT | Performed by: FAMILY MEDICINE

## 2025-03-13 PROCEDURE — 99213 OFFICE O/P EST LOW 20 MIN: CPT | Performed by: FAMILY MEDICINE

## 2025-03-13 RX ORDER — EZETIMIBE 10 MG/1
10 TABLET ORAL DAILY
Qty: 90 TABLET | Refills: 3 | Status: SHIPPED | OUTPATIENT
Start: 2025-03-13

## 2025-03-13 ASSESSMENT — FIBROSIS 4 INDEX: FIB4 SCORE: 1.42

## 2025-03-13 NOTE — PROGRESS NOTES
Verbal consent was acquired by the patient to use [employer] ambient listening note generation during this visit.    Subjective:     HPI:   History of Present Illness  The patient presents for evaluation of hypercholesterolemia, urinary retention, knee pain, lower back pain, insomnia, and blepharitis.    The patient has a 30-year history of hypercholesterolemia, with a recent exacerbation in serum cholesterol levels. He reports an intolerance to statins and expresses concern regarding potential allergic reactions to other medications, citing a general sensitivity to pharmacological agents. He believes that increased physical activity, particularly walking, would be beneficial in managing his cholesterol levels.    The patient has discontinued all medications due to issues with urinary retention. He consulted with Dr. Pinzon, an orthopedic specialist, who advised him to discuss this matter with his primary care physician.    The patient plans to resume meloxicam therapy for his arthritis, with the expectation that it will alleviate both his knee and lower back pain. He has previously used ibuprofen and currently takes naproxen (Aleve) sparingly for knee pain.    The patient reports severe lower back pain and suspects that he may have dislodged his stitches, leading to significant bleeding yesterday, which he managed by applying bandages. He was prescribed cephalexin (Keflex) as a prophylactic antibiotic prior to his surgery.    The patient states that his knee is in good condition, but he experiences pain in his leg, particularly in the shin bone. This pain, while not severe, disrupts his sleep at night. He notes that his leg pain has improved compared to previous episodes. His orthopedic specialist informed him that the soreness in his leg is a result of tourniquet use during surgery, which occurred 55 days ago. He reports gradual improvement in his condition and takes naproxen (Aleve) at night to manage his  "pain.    The patient also reports insomnia, stating that he typically falls asleep around 10:30 PM, wakes up at 11:15 PM, and remains awake until 4:00 AM. During this time, he consumes half a pot of coffee and is able to fall asleep again around 4:00 AM.    The patient was diagnosed with blepharitis by his optometrist and was prescribed eye drops, which he found to be prohibitively expensive even with insurance coverage. He was advised to use tea tree oil wipes as an alternative treatment, which he has been utilizing.    SOCIAL HISTORY  The patient admits to drinking beer.    ALLERGIES  The patient has an intolerance to STATINS.    MEDICATIONS  Current: Aleve, meloxicam, Keflex  The 10-year ASCVD risk score (Palma DK, et al., 2019) is: 24.2%    Health Maintenance: Completed    Objective:     Exam:  /64 (BP Location: Right arm, Patient Position: Sitting, BP Cuff Size: Adult)   Pulse 74   Temp 36.5 °C (97.7 °F) (Temporal)   Ht 1.778 m (5' 10\")   Wt 84.3 kg (185 lb 12.8 oz)   SpO2 96%   BMI 26.66 kg/m²  Body mass index is 26.66 kg/m².    Physical Exam  Vitals reviewed.   Constitutional:       Appearance: Normal appearance.   HENT:      Head: Normocephalic and atraumatic.      Nose: Nose normal.   Cardiovascular:      Rate and Rhythm: Normal rate and regular rhythm.      Pulses: Normal pulses.      Heart sounds: Normal heart sounds. No murmur heard.  Pulmonary:      Effort: Pulmonary effort is normal.      Breath sounds: Normal breath sounds.   Abdominal:      General: Abdomen is flat.      Palpations: Abdomen is soft.   Skin:     General: Skin is warm and dry.   Neurological:      Mental Status: He is alert.   Psychiatric:         Mood and Affect: Mood normal.         Behavior: Behavior normal.             Results  Laboratory Studies  Fasting sugar was slightly elevated at 107. A1c decreased from 6.3 to 5.6. Cholesterol levels were significantly elevated.    Assessment & Plan:     1. Hypercholesterolemia  " ezetimibe (ZETIA) 10 MG Tab      2. Statin intolerance  ezetimibe (ZETIA) 10 MG Tab      3. Myalgia due to statin  ezetimibe (ZETIA) 10 MG Tab      4. Impaired fasting blood sugar            Assessment & Plan  1. Hypercholesterolemia.  His risk for cerebrovascular accidents and myocardial infarctions is currently at 25 percent, which is significantly elevated. A prior authorization will be submitted for Zetia, pending insurance approval. If the insurance company rejects the claim, a referral to a vascular specialist will be considered. He is advised to continue his current regimen of Aleve.    2. Urinary retention.  He has discontinued all medications due to urinary retention issues. This decision is supported, and he is advised that he does not need to take any of the previously prescribed medications unless necessary.    3. Knee pain.  He reports that his knee is fine, but he experiences aching in his thigh and shin bone, especially at night. He is advised to continue using Aleve as needed for pain management.    4. Lower back pain.  He is advised to consider using meloxicam for his lower back pain. Alternatively, he can use ibuprofen if preferred. He is encouraged to gradually reintroduce these medications as needed.    5. Insomnia.  He reports difficulty sleeping at night, often staying awake until early morning. No specific treatment plan was discussed during this visit.    6. Blepharitis.  He is currently using tea tree oil wipes as recommended by his optometrist. He is advised to continue this regimen.    7. Post-surgical bleeding.  He experienced bleeding after his melanoma removal procedure. He is advised that some bleeding is normal, but if it continues or worsens, he should contact his dermatologist.    PROCEDURE  Melanoma was removed.          Return in about 6 months (around 9/13/2025) for Med check.    Please note that this dictation was created using voice recognition software. I have made every  reasonable attempt to correct obvious errors, but I expect that there are errors of grammar and possibly content that I did not discover before finalizing the note.    Lilian Gloria MD  Family Medicine and Non - Operative Sports Medicine   [facility]

## 2025-03-18 ENCOUNTER — RESULTS FOLLOW-UP (OUTPATIENT)
Dept: DERMATOLOGY | Facility: IMAGING CENTER | Age: 70
End: 2025-03-18
Payer: MEDICARE

## 2025-06-02 ENCOUNTER — APPOINTMENT (OUTPATIENT)
Dept: MEDICAL GROUP | Facility: PHYSICIAN GROUP | Age: 70
End: 2025-06-02
Payer: MEDICARE

## 2025-06-02 VITALS
BODY MASS INDEX: 27.57 KG/M2 | DIASTOLIC BLOOD PRESSURE: 70 MMHG | OXYGEN SATURATION: 96 % | HEIGHT: 70 IN | HEART RATE: 78 BPM | WEIGHT: 192.6 LBS | SYSTOLIC BLOOD PRESSURE: 130 MMHG | TEMPERATURE: 97.9 F

## 2025-06-02 DIAGNOSIS — Z85.89 HISTORY OF SQUAMOUS CELL CARCINOMA: ICD-10-CM

## 2025-06-02 DIAGNOSIS — R49.0 HOARSENESS OF VOICE: ICD-10-CM

## 2025-06-02 DIAGNOSIS — R73.01 IMPAIRED FASTING BLOOD SUGAR: ICD-10-CM

## 2025-06-02 DIAGNOSIS — F17.210 CIGARETTE NICOTINE DEPENDENCE WITHOUT COMPLICATION: ICD-10-CM

## 2025-06-02 DIAGNOSIS — R03.0 ELEVATED BLOOD PRESSURE READING IN OFFICE WITHOUT DIAGNOSIS OF HYPERTENSION: Primary | ICD-10-CM

## 2025-06-02 DIAGNOSIS — C43.59 MALIGNANT MELANOMA OF TORSO EXCLUDING BREAST (HCC): Primary | ICD-10-CM

## 2025-06-02 DIAGNOSIS — C32.9 LARYNGEAL SQUAMOUS CELL CARCINOMA (HCC): ICD-10-CM

## 2025-06-02 DIAGNOSIS — R91.1 PULMONARY NODULE: ICD-10-CM

## 2025-06-02 PROCEDURE — 99214 OFFICE O/P EST MOD 30 MIN: CPT | Performed by: FAMILY MEDICINE

## 2025-06-02 PROCEDURE — 3075F SYST BP GE 130 - 139MM HG: CPT | Performed by: FAMILY MEDICINE

## 2025-06-02 PROCEDURE — 3078F DIAST BP <80 MM HG: CPT | Performed by: FAMILY MEDICINE

## 2025-06-02 RX ORDER — LOTILANER OPHTHALMIC SOLUTION 2.5 MG/ML
SOLUTION/ DROPS OPHTHALMIC
COMMUNITY
Start: 2025-05-28

## 2025-06-02 ASSESSMENT — FIBROSIS 4 INDEX: FIB4 SCORE: 1.44

## 2025-06-02 NOTE — PROGRESS NOTES
Verbal consent was acquired by the patient to use [employer] ambient listening note generation during this visit.    Subjective:     HPI:   History of Present Illness  The patient presents for evaluation of dysphonia, fatigue, and hypercholesterolemia.    He reports a persistent dysphonia that commenced approximately 3 weeks ago, raising concerns about a potential recurrence of his previous malignancy. The patient has a history of squamous cell carcinoma diagnosed in 2010, which was managed with radiation therapy. A subsequent nodule was excised and determined to be benign. His last treatment occurred around 2014 or 2015. The patient continues to smoke, with a consumption of up to half a pack daily. 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 He denies any internal malignancies, unintentional weight loss, hemoptysis, or epistaxis. He has undergone numerous computed tomography (CT) scans and laryngoscopic evaluations in the past. He denies dysphagia or odynophagia. He reports no associated symptoms such as pharyngitis, cough, postnasal drip, or rhinorrhea. The patient has a history of allergies, which manifest as rhinorrhea and periorbital edema during windy conditions.    He is currently prescribed Zetia for hypercholesterolemia management, which he tolerates well except for mild nausea in the mornings. He administers the medication at 8:30 PM nightly.    The patient experiences profound fatigue, which he attributes to fluctuations in his blood glucose levels. He reports postprandial somnolence following sugar intake, occurring within 15 minutes, although this does not happen after consuming protein-rich foods. He also reports fatigue post-dinner. He does not frequently snore and denies nocturnal dyspnea or gasping for air. His sleep pattern is fragmented, with durations of 1.5 to 2 hours at a  "time, and he often experiences daytime somnolence. He awakens at 4:00 AM, consumes approximately 4 cups of coffee, and then naps at sunrise. A previously scheduled polysomnography was canceled due to the COVID-19 pandemic. He requires the television to be on to facilitate sleep onset. He believes that increased physical activity would ameliorate his condition, but he is constrained by knee pain.    The patient has a history of gastroesophageal reflux disease (GERD), which has resolved within the past year. He rarely experiences pyrosis and finds relief with one Lori-[place] tablet.    PAST SURGICAL HISTORY:  The patient has undergone knee arthroplasty and does not plan to have the contralateral knee replaced.    SOCIAL HISTORY  The patient admits to smoking close to half a pack per day.    Health Maintenance: Completed    Objective:     Exam:  /70 (BP Location: Left arm, Patient Position: Sitting, BP Cuff Size: Adult)   Pulse 78   Temp 36.6 °C (97.9 °F) (Temporal)   Ht 1.778 m (5' 10\")   Wt 87.4 kg (192 lb 9.6 oz)   SpO2 96%   BMI 27.64 kg/m²  Body mass index is 27.64 kg/m².    Physical Exam  Vitals reviewed.   Constitutional:       Appearance: Normal appearance.   HENT:      Head: Normocephalic and atraumatic.      Nose: Nose normal.   Cardiovascular:      Rate and Rhythm: Normal rate and regular rhythm.      Pulses: Normal pulses.      Heart sounds: Normal heart sounds. No murmur heard.  Pulmonary:      Effort: Pulmonary effort is normal.      Breath sounds: Normal breath sounds.   Abdominal:      General: Abdomen is flat.      Palpations: Abdomen is soft.   Skin:     General: Skin is warm and dry.   Neurological:      Mental Status: He is alert.   Psychiatric:         Mood and Affect: Mood normal.         Behavior: Behavior normal.       Anterior lymphadenopathy noted bilaterally.   Hoarseness noted       Results  Labs   - Thyroid levels: 03/2025, Normal   - Cholesterol levels: 03/2025, Being " monitored   - Glucose: 03/2025, Slightly high   - A1c: 03/2025, Fine    Assessment & Plan:     1. Malignant melanoma of torso excluding breast (HCC)        2. History of squamous cell carcinoma        3. Cigarette nicotine dependence without complication        4. Hoarseness of voice  CT-SOFT TISSUE NECK WITH      5. Laryngeal squamous cell carcinoma (HCC)  Referral to ENT    CT-SOFT TISSUE NECK WITH    CANCELED: CT-SOFT TISSUE NECK WITH      6. Pulmonary nodule  CT-CHEST (THORAX) W/O          Assessment & Plan  1. Hoarseness.  - Given the patient's history of squamous cell carcinoma and the current presentation of hoarseness, there is a concern for potential malignancy.  - A stat soft tissue CT scan of the neck will be ordered to investigate any nodules.  - An urgent referral to an ENT specialist will be made. If the CT scan reveals any abnormalities, the ENT referral will be expedited.  - If there are signs of worsening condition such as hemoptysis, weight loss, or anorexia, a consultation with an oncology nurse practitioner may be considered.    2. Hypercholesterolemia.  - The patient is currently taking Zetia (ezetimibe) 10 mg at 8:30 PM daily without significant side effects, except for mild nausea in the morning.  - The patient is advised to continue taking Zetia as prescribed.  - Cholesterol levels will continue to be monitored.  - Recent labs showed slightly elevated glucose, but A1c was within normal limits.    3. Fatigue.  - The patient reports significant fatigue, which could be related to various factors including potential malignancy, vitamin D deficiency, thyroid dysfunction, iron deficiency, or B12 deficiency.  - A sleep study will be considered if symptoms persist.  - Further blood tests may be ordered to investigate these potential causes if symptoms do not improve.  - The patient experiences fatigue after consuming sugar and meals, and has difficulty sleeping through the night.    4. Hiatal  hernia.  - The patient has a history of hiatal hernia, which can predispose him to acid reflux.  - He reports that his acid reflux symptoms have significantly improved over the past year and are well-managed with occasional use of Lori-[place].  - Previous CT scans have shown evidence of the hiatal hernia.  - No current symptoms of acid reflux or heartburn are reported.          Return in about 2 weeks (around 6/16/2025) for f/up imaging.    Please note that this dictation was created using voice recognition software. I have made every reasonable attempt to correct obvious errors, but I expect that there are errors of grammar and possibly content that I did not discover before finalizing the note.    Lilian Gloria MD  Family Medicine and Non - Operative Sports Medicine   [facility]

## 2025-06-03 ENCOUNTER — HOSPITAL ENCOUNTER (OUTPATIENT)
Dept: LAB | Facility: MEDICAL CENTER | Age: 70
End: 2025-06-03
Attending: FAMILY MEDICINE
Payer: MEDICARE

## 2025-06-03 ENCOUNTER — TELEPHONE (OUTPATIENT)
Dept: MEDICAL GROUP | Facility: PHYSICIAN GROUP | Age: 70
End: 2025-06-03
Payer: MEDICARE

## 2025-06-03 DIAGNOSIS — I10 PRIMARY HYPERTENSION: ICD-10-CM

## 2025-06-03 DIAGNOSIS — R03.0 ELEVATED BLOOD PRESSURE READING IN OFFICE WITHOUT DIAGNOSIS OF HYPERTENSION: Primary | ICD-10-CM

## 2025-06-03 DIAGNOSIS — E66.3 OVERWEIGHT (BMI 25.0-29.9): ICD-10-CM

## 2025-06-03 LAB
ANION GAP SERPL CALC-SCNC: 13 MMOL/L (ref 7–16)
BUN SERPL-MCNC: 14 MG/DL (ref 8–22)
CALCIUM SERPL-MCNC: 8.9 MG/DL (ref 8.5–10.5)
CHLORIDE SERPL-SCNC: 103 MMOL/L (ref 96–112)
CO2 SERPL-SCNC: 20 MMOL/L (ref 20–33)
CREAT SERPL-MCNC: 0.98 MG/DL (ref 0.5–1.4)
GFR SERPLBLD CREATININE-BSD FMLA CKD-EPI: 83 ML/MIN/1.73 M 2
GLUCOSE SERPL-MCNC: 102 MG/DL (ref 65–99)
POTASSIUM SERPL-SCNC: 4.1 MMOL/L (ref 3.6–5.5)
SODIUM SERPL-SCNC: 136 MMOL/L (ref 135–145)

## 2025-06-03 PROCEDURE — 36415 COLL VENOUS BLD VENIPUNCTURE: CPT

## 2025-06-03 PROCEDURE — 80048 BASIC METABOLIC PNL TOTAL CA: CPT

## 2025-06-03 NOTE — Clinical Note
[facility]  [address] [address]    [id number]    Jesus Alberto Bowles  [address]  [place] [address]    Melissa 3, 2025    Member Name: Alberto Bowles   Member Number: [id number]   Reference Number: [id number]   Approved Services: MRI/CAT Scan   Approved Service Dates: 06/02/2025 - 08/03/2025   Requesting Provider: Lilian Gloria   Requested Provider: [facility]     Dear Alberto Hiram Wilbur:    The following medical service(s) requested by Lilian Gloria have been approved:    Procedure Code Procedure Code Name Requested Quantity Approved Quantity Status   14498 (CPT®) OR CT NECK TISSUE CONTRAST 1 1 Authorized       Approved Quantity means the number of visits approved for medication treatments and/or medical services.    The services should be provided by [facility] no later than 08/03/2025. Please contact the provider listed below with any questions.     Provider Information:  [facility]  [phone]    Your plan benefit may require a deductible, co-payment or coinsurance for these services. This authorization does not guarantee [facility] will pay the claim for services that you receive. Payment by [facility] for these services is subject to the terms of your Evidence of Coverage, your eligibility at the time of service, and confirmation of benefit coverage.    For any questions or additional information, please contact Customer Service:    [facility] Toll Free: [phone]  TTY users dial: [phone]   Call Center Hours:  Oct 1 - Mar 31, Mon - Fri 7 AM to 8 PM PST  Oct 1 - Mar 31, Sat - Sun 8 AM to 8 PM PST  Apr 1 - Sep 30, Mon - Fri 7 AM to 8 PM PST   Office Hours: Mon - Fri 8 AM to 5 PM PST   E-mail: [email or web address]   Website:  [email or web address]      This information is available for free in other languages. Please contact Customer Service at the phone  number above for more information. [facility] complies with applicable Federal civil rights laws and does not discriminate on the basis of race, color, national origin, age, disability or sex.    Sincerely,     Healthcare Utilization Management Department     Cc: [facility]   Lilian Gloria    Multi-Language Insert  Multi- Services  English: We have free  services to answer any questions you may have about our health or drug plan.  To get an , just call us at [phone].  Someone who speaks English/Language can help you.  This is a free service.  Armenian: Tenemos servicios de intérprete sin costo alguno  para responder cualquier pregunta que pueda tener sobre nuestro plan de brook o medicamentos. Para hablar con un intérprete, por favor llame al [phone]. Alguien que hable español le podrá ayudar. Sadie es un servicio gratuito.  Chinese Mandarin: ?????????????????????????????? ???????????????? [phone]????????????????? ?????????  Chinese Cantonese: ?????????????????????????????? ????????????? [phone]???????????????????? ????????  Tagalog:  Nayana mckenna serbisyo sa martinezsasalvince moorea jocelyn zamora hinggil sa zeeshan blackmon o panggamot.  cruz Dean sa [phone]. Yris Christinaalog.  Lang ramos.  Swazi:  Nous proposons adela services gratuits d'interprétation pour répondre à toutes geovani questions relatives à notre régime de santé ou d'assurance-médicaments. Pour accéder au service d'interprétation, il vous suffit de nous appeler au [phone]. Un interlocuteur parlant Français pourra vous aider. Ce service est gratuit.  Congolese:  Tamar gutierrez có d?ch v? thông d?ch mi?n phí ð? tr? l?i các câu h?i v? chýõng s?c kh?e và chýõng trìn thu?c men. N?u quí v? c?n thông d?ch viên sandi  g?i [phone] s? có nhân viên nói ti?ng Vi?t giúp ð? quí v?. Ðây là d?ch v? mi?n phí .  [place]er:  Unser kostenser Dolmetscherservice beantwortet Ihren Fragen zu unserem Gesundheits- und Arzneimittelplan. Unsere Dolmetscher erreichen Sie [phone]. Man wird Ihnen julio auf [facility]. Dieser Service ist [facility].  Sinhala:  ??? ?? ?? ?? ?? ??? ?? ??? ?? ???? ?? ?? ???? ???? ????. ?? ???? ????? ?? [phone] ??? ??? ????.  ???? ?? ???? ?? ?? ????. ? ???? ??? ?????.   [place] citizen: Åñëè ó âàñ âîçíèêíóò âîïðîñû îòíîñèòåëüíî ñòðàõîâîãî èëè ìåäèêàìåíòíîãî ïëàíà, âû ìîæåòå âîñïîëüçîâàòüñÿ íàøèìè áåñïëàòíûìè óñëóãàìè ïåðåâîä÷èêîâ. ×òîáû âîñïîëüçîâàòüñÿ óñëóãàìè ïåðåâîä÷èêà, ïîçâîíèòå íàì ïî òåëåôîíó [phone]. Âàì îêàæåò ïîìîùü ñîòðóäíèê, êîòîðûé ãîâîðèò ïî-póññêè. Äàííàÿ óñëóãà áåñïëàòíàÿ.  Occitan: ÅääÇ äÞÏã ÎÏãÇÊ ÇáãÊÑÌã ÇáÝæÑí ÇáãÌÇäíÉ ááÅÌÇÈÉ Úä Ãí ÃÓÆáÉ ÊÊÚáÞ ÈÇáÕÍÉ Ãæ ÌÏæá ÇáÃÏæíÉ áÏíäÇ. ááÍÕæá Úáì ãÊÑÌã ÝæÑí¡ áíÓ Úáíß Óæì ÇáÇÊÕÇá ÈäÇ Úáì [phone] . ÓíÞæã ÔÎÕ ãÇ íÊÍÏË ÇáÚÑÈíÉ ÈãÓÇÚÏÊß. åÐå ÎÏãÉ ãÌÇäíÉ.  Jacob: ????? ????????? ?? ??? ?? ????? ?? ???? ??? ???? ???? ?? ?????? ?? ???? ???? ?? ??? ????? ??? ????? ???????? ?????? ?????? ???. ?? ???????? ??????? ???? ?? ???, ?? ???? [phone] ?? ??? ????. ??? ??????? ?? ?????? ????? ?? ???? ??? ?? ???? ??. ?? ?? ????? ???? ??.   Sami:  È disponibile un servizio di interpretariato gratuito per rispondere a eventuali domande sul nostro piano sanitario e farmaceutico. Per un interprete, contattare il raquel [phone]. Un nostro incaricato twyla parla Italianovi fornirà l'assistenza necessaria. È un servizio gratuito.  Portugués:  Dispomos de serviços de interpretação gratuitos para responder a qualquer questão que tenha acerca do nosso plano de saúde ou de medicação. Para obter um intérprete, contacte-nos através do número [phone]. Irá encontrar alguém que fale o idioma  Português para o ajudar. Sadie serviço é gratuito.  Ghanaian  Creole:  Nou genyen sèvis entèprèt gratis lauren reponn tout kesyon ou ta genyen konsènan plan medikal oswa dwòg nou an.  Lauren jwenn yon entèprèt, jis rele nou nan [phone]. Yon moun ki pale Kreyòl kapab sundar w.  Sa a se yon sèvis ki gratis.  Polish:  Umo¿liwiamy bezp³atne skorzystanie z us³ug t³umacza ustnego, który pomo¿e w uzyskaniu odpowiedzi na temat planu zdrowotnego lub dawkowania leków. Elida skorzystaæ z pomocy t³umacza znaj¹cego paul armstrong¿y zadzwoniæ pod numer [phone]. Ta us³uga jest bezp³atna.  Indian: ????? ??????? ????????????????????? ??????????????????????????????????[phone] ???????????????? ? ????????????????? ?????

## 2025-06-04 ENCOUNTER — RESULTS FOLLOW-UP (OUTPATIENT)
Dept: MEDICAL GROUP | Facility: PHYSICIAN GROUP | Age: 70
End: 2025-06-04

## 2025-06-04 ENCOUNTER — HOSPITAL ENCOUNTER (OUTPATIENT)
Dept: RADIOLOGY | Facility: MEDICAL CENTER | Age: 70
End: 2025-06-04
Attending: FAMILY MEDICINE
Payer: MEDICARE

## 2025-06-04 DIAGNOSIS — I65.29 STENOSIS OF CAROTID ARTERY, UNSPECIFIED LATERALITY: Primary | ICD-10-CM

## 2025-06-04 DIAGNOSIS — R49.0 HOARSENESS OF VOICE: ICD-10-CM

## 2025-06-04 DIAGNOSIS — I65.09 VERTEBRAL ARTERY STENOSIS, UNSPECIFIED LATERALITY: ICD-10-CM

## 2025-06-04 DIAGNOSIS — C32.9 LARYNGEAL SQUAMOUS CELL CARCINOMA (HCC): ICD-10-CM

## 2025-06-04 PROCEDURE — 70491 CT SOFT TISSUE NECK W/DYE: CPT

## 2025-06-04 PROCEDURE — 700117 HCHG RX CONTRAST REV CODE 255: Performed by: FAMILY MEDICINE

## 2025-06-04 RX ADMIN — IOHEXOL 80 ML: 350 INJECTION, SOLUTION INTRAVENOUS at 10:15

## 2025-06-11 ENCOUNTER — HOSPITAL ENCOUNTER (OUTPATIENT)
Facility: MEDICAL CENTER | Age: 70
End: 2025-06-11
Attending: OTOLARYNGOLOGY | Admitting: OTOLARYNGOLOGY
Payer: MEDICARE

## 2025-06-16 NOTE — Clinical Note
REFERRAL APPROVAL NOTICE         Sent on June 16, 2025                   Jesus Alberto Bowles  [address]  [place] [address]                   Dear Mr. Bowles,    After a careful review of the medical information and benefit coverage, [facility] has processed your referral. See below for additional details.    If applicable, you must be actively enrolled with your insurance for coverage of the authorized service. If you have any questions regarding your coverage, please contact your insurance directly.    REFERRAL INFORMATION   Referral #:  [id number]  Referred-To Department    Referred-By Provider:  Vascular Surgery    Lilian Gloria M.D.   Department Of Surgery      [address]  [place] [address]  [phone] [address], [address]  [place] [id number] [phone]    Referral Start Date:  06/04/2025  Referral End Date:   06/05/2026             SCHEDULING  If you do not already have an appointment, please call [phone] to make an appointment.     MORE INFORMATION  If you do not already have a [employer] account, sign up at: [email or web address]  You can access your medical information, make appointments, see lab results, billing information, and more.  If you have questions regarding this referral, please contact  the [facility] Referrals department at:             [phone]. Monday - Friday 8:00AM - 5:00PM.     Sincerely,    [facility]

## 2025-07-03 ENCOUNTER — OFFICE VISIT (OUTPATIENT)
Facility: MEDICAL CENTER | Age: 70
End: 2025-07-03
Payer: MEDICARE

## 2025-07-03 VITALS
HEIGHT: 70 IN | DIASTOLIC BLOOD PRESSURE: 62 MMHG | HEART RATE: 77 BPM | BODY MASS INDEX: 28.09 KG/M2 | WEIGHT: 196.21 LBS | OXYGEN SATURATION: 98 % | TEMPERATURE: 98.5 F | SYSTOLIC BLOOD PRESSURE: 128 MMHG

## 2025-07-03 DIAGNOSIS — I65.21 CAROTID ARTERY STENOSIS, ASYMPTOMATIC, RIGHT: Primary | ICD-10-CM

## 2025-07-03 PROCEDURE — 99204 OFFICE O/P NEW MOD 45 MIN: CPT | Performed by: SURGERY

## 2025-07-03 PROCEDURE — 3078F DIAST BP <80 MM HG: CPT | Performed by: SURGERY

## 2025-07-03 PROCEDURE — 3074F SYST BP LT 130 MM HG: CPT | Performed by: SURGERY

## 2025-07-03 ASSESSMENT — FIBROSIS 4 INDEX: FIB4 SCORE: 1.44

## 2025-07-03 NOTE — H&P
Vascular Surgery            New Patient Consultation    Patient:Alberto Bowles  MRN:[id number]  Primary care physician:Lilian Gloria M.D.  Referring Provider: Lilian Gloria M.D.  Ref Provider (PCP)     Vascular Consultant: Chas Armendariz MD    Date: 7/3/2025  _____________________________________________________    Chief Complaint:     Carotid artery disease    History of Present Illness:   Alberto Bowles  is a 70 y.o. year old male with a history of laryngeal cancer and radiation therapy to the neck.  He was being evaluated for deterioration of his voice and imaging detected moderate carotid artery stenosis.  There was also occlusion of his vertebral artery.  The patient is asymptomatic with respect to any TIA or stroke symptoms.  The patient denies any transient numbness or weakness of his upper or lower extremities.  He denies any visual disturbances consistent with amaurosis fugax.  Patient is also had no difficulty speaking consistent with expressive aphasia.    Past Medical History:   Past Medical History[1]  Past Surgical History:   Past Surgical History[2]  Allergies:   Allergies[3]  Medications:   Encounter Medications[4]  Social History:     Social History     Socioeconomic History    Marital status:      Spouse name: Not on file    Number of children: Not on file    Years of education: Not on file    Highest education level: Some college, no degree   Occupational History    Not on file   Tobacco Use    Smoking status: Every Day     Current packs/day: 0.50     Average packs/day: 0.7 packs/day for 75.9 years (50.3 ttl pk-yrs)     Types: Cigarettes     Start date: 5/1/1974    Smokeless tobacco: Never   Vaping Use    Vaping status: Never Used   Substance and Sexual Activity    Alcohol use: Yes     Alcohol/week: 8.4 oz     Types: 14 Cans of beer per week    Drug use: Never    Sexual activity: Yes     Partners: Female   Other Topics Concern    Not on file   Social History  Narrative    Not on file     Social Drivers of Health     Financial Resource Strain: Low Risk  (2/4/2025)    Overall Financial Resource Strain (CARDIA)     Difficulty of Paying Living Expenses: Not hard at all   Food Insecurity: No Food Insecurity (2/4/2025)    Hunger Vital Sign     Worried About Running Out of Food in the Last Year: Never true     Ran Out of Food in the Last Year: Never true   Transportation Needs: No Transportation Needs (2/4/2025)    PRAPARE - Transportation     Lack of Transportation (Medical): No     Lack of Transportation (Non-Medical): No   Physical Activity: Inactive (2/4/2025)    Exercise Vital Sign     Days of Exercise per Week: 0 days     Minutes of Exercise per Session: 0 min   Stress: No Stress Concern Present (2/4/2025)    [employer] - Occupational Stress Questionnaire     Feeling of Stress : Only a little   Social Connections: Moderately Isolated (2/4/2025)    Social Connection and Isolation Panel [NHANES]     Frequency of Communication with Friends and Family: More than three times a week     Frequency of Social Gatherings with Friends and Family: Twice a week     Attends Taoism Services: Never     Active Member of Clubs or Organizations: No     Attends Club or Organization Meetings: Never     Marital Status:    Intimate Partner Violence: Not on file   Housing Stability: Low Risk  (2/4/2025)    Housing Stability Vital Sign     Unable to Pay for Housing in the Last Year: No     Number of Times Moved in the Last Year: 0     Homeless in the Last Year: No      Tobacco Use History[5]  Social History     Substance and Sexual Activity   Alcohol Use Yes    Alcohol/week: 8.4 oz    Types: 14 Cans of beer per week     Social History     Substance and Sexual Activity   Drug Use Never      Family History:     Family History   Problem Relation Age of Onset    Squamous cell carcinoma Mother     Hypertension Father     Melanoma Sister     Hypertension Brother      Hypertension Brother        Review of Systems:   Constitutional:   Reports -deterioration of and hoarseness of his voice    Denies Chest pain   Denies SOB   Denies abdominal pain   Denies focal neuro deficits      Exam:   There were no vitals taken for this visit.    Constitutional: Alert, oriented, no acute distress  HEENT:  Normocephalic and atraumatic, EOMI  Neck:   Supple, no JVD,   Cardiovascular: Regular rate and rhythm,   Pulmonary:  Good air entry bilaterally,    Abdominal:  Soft, non-tender, non-distended  Musculoskeletal: No tenderness, no deformity  Neurological:  grossly intact, no focal deficits  Skin:   Skin is warm and dry. No rash noted.  Vascular exam: Upper extremities warm and adequately perfused, no edema     Lower extremities warm and adequately perfused, no edema           Imaging:   IMPRESSION:     1.  No laryngeal lesions or lymphadenopathy in the neck.  2.  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  3.  Approximately 50-70% stenosis of the right ICA origin.  4.  Chronic left cerebellar encephalomalacia.      Assessment and Plan:   - Moderate carotid artery stenosis in the setting of previous external beam radiation to the neck.  Patient is asymptomatic with respect to any TIA or stroke symptoms.    I discussed the pathophysiology and natural history of carotid artery stenosis.  I discussed the indications for intervention.  This patient does not meet criteria for intervention and will be placed in a surveillance program.  I have recommended repeating his carotid duplex in 1 year.  I also had a discussion with respect to cardiovascular risk reduction.  He reports that he cannot take statins.  I have elected to refer him to the [facility] for vascular health for evaluation for tighter cholesterol control in the setting of carotid disease.  The [facility] for vascular health is 3 to assume  surveillance of his carotid arteries as well if they deem appropriate.        _____________________________________________________  Chas Armendariz MD  [facility]  [phone]  [address], [place] [address]           [1]   Past Medical History:  Diagnosis Date    Arthritis 2000    Cancer (HCC) 2009    larynx    Cataract 2024    Chronic back pain     Cigarette nicotine dependence     Hiatus hernia syndrome 2015    High cholesterol 2000    Hypertension     Indigestion    [2]   Past Surgical History:  Procedure Laterality Date    ARTHROPLASTY, KNEE, ROBOT-ASSISTED Left 1/16/2025    Procedure: ROBOTIC LEFT TOTAL KNEE ARTHROPLASTY;  Surgeon: Jake Bowman M.D.;  Location: SURGERY [facility];  Service: Ortho Robotic    NM FLUOROSCOPIC GUIDANCE NEEDLE PLACEMENT Left 02/28/2024    Procedure: LEFT genicular nerve neurotomy with sedation. plan on sedation 1mg versed and 50mcg fentanyl. this is to be done after the genicular nerve block if this is a positive block;  Surgeon: Raymundo Rolle M.D.;  [facility];  Service: Pain Management    NM INJ AA/STRD GNCLR NRV BRNCH W/IMG Left 02/13/2024    Procedure: LEFT knee genicular nerve blocks with fluoroscopic guidance;  Surgeon: Raymundo Rolle M.D.;  Location: [facility];  Service: Pain Management    APPENDECTOMY      OTHER  2009    larynx    TONSILLECTOMY Bilateral    [3]   Allergies  Allergen Reactions    Ibuprofen Swelling     Facial swelling   [4]   Outpatient Encounter Medications as of 7/3/2025   Medication Sig Dispense Refill    XDEMVY 0.25 % Solution       ezetimibe (ZETIA) 10 MG Tab Take 1 Tablet by mouth every day. 90 Tablet 3     No facility-administered encounter medications on file as of 7/3/2025.   [5]   Social History  Tobacco Use   Smoking Status Every Day    Current packs/day: 0.50    Average packs/day: 0.7 packs/day for 75.9 years (50.3 ttl pk-yrs)    Types: Cigarettes    Start  date: 5/1/1974   Smokeless Tobacco Never

## (undated) DEVICE — SUTURE 5 ETHIBOND V-37 (12PK/BX)

## (undated) DEVICE — HANDPIECE 10FT INTPLS SCT PLS IRRIGATION HAND CONTROL SET (6/PK)

## (undated) DEVICE — SUTURE 3-0 MONOCRYL PLUS PS-1 - 27 INCH (36/BX)

## (undated) DEVICE — GOWN WARMING STANDARD FLEX - (30/CA)

## (undated) DEVICE — HUMID-VENT HEAT AND MOISTURE EXCHANGE- (50/BX)

## (undated) DEVICE — SET LEADWIRE 5 LEAD BEDSIDE DISPOSABLE ECG (1SET OF 5/EA)

## (undated) DEVICE — SLEEVE, VASO, THIGH, MED

## (undated) DEVICE — PACK TOTAL KNEE (1/CA)

## (undated) DEVICE — LENS/HOOD FOR SPACESUIT - (32/PK) PEEL AWAY FACE

## (undated) DEVICE — KIT DRAPE RIO ONE PIECE WITH POCKETS(10EA MINIMUM ORDER)  (1EA)

## (undated) DEVICE — Device

## (undated) DEVICE — DRAPE LARGE 3 QUARTER - (20/CA)

## (undated) DEVICE — TUBING CLEARLINK DUO-VENT - C-FLO (48EA/CA)

## (undated) DEVICE — GLOVE BIOGEL PI ORTHO SZ 8.5 PF LF (40/BX)

## (undated) DEVICE — LACTATED RINGERS INJ 1000 ML - (14EA/CA 60CA/PF)

## (undated) DEVICE — SODIUM CHL. IRRIGATION 0.9% 3000ML (4EA/CA 65CA/PF)

## (undated) DEVICE — GOWN SURGICAL XX-LARGE - (28EA/CA) SIRUS NON REINFORCED

## (undated) DEVICE — SUTURE 2-0 VICRYL PLUS CT-1 - 8 X 18 INCH(12/BX)

## (undated) DEVICE — SUTURE GENERAL

## (undated) DEVICE — TIP INTPLS HFLO ML ORFC BTRY - (12/CS) FOR SURGILAV

## (undated) DEVICE — SODIUM CHL IRRIGATION 0.9% 1000ML (12EA/CA)

## (undated) DEVICE — SENSOR OXIMETER ADULT SPO2 RD SET (20EA/BX)

## (undated) DEVICE — PIN FIXATION BONE STERILE 3.2MM X 140MM (2EA/PK)

## (undated) DEVICE — SYSTEM NAVIGATION VIZADISC KNEE PROCEDURE TRACKING KIT (1EA)

## (undated) DEVICE — CANISTER SUCTION RIGID RED 1500CC (40EA/CA)

## (undated) DEVICE — SUCTION INSTRUMENT YANKAUER BULBOUS TIP W/O VENT (50EA/CA)

## (undated) DEVICE — SET EXTENSION WITH 2 PORTS (48EA/CA) ***PART #2C8610 IS A SUBSTITUTE*****

## (undated) DEVICE — PAD PREP 24 X 48 CUFFED - (100/CA)

## (undated) DEVICE — ELECTRODE DUAL RETURN W/ CORD - (50/PK)

## (undated) DEVICE — DISPOSABLE WOUND VAC PICO 10 X 30 CM - WOUND CARE (3/CA)

## (undated) DEVICE — PIN FIXATION BONE STERILE 3.2MM X 110MM (2EA/PK)

## (undated) DEVICE — DRESSING AQACEL ADVANTAGE ANTIMICROBIAL WITH HYDROFIBER .075IN X 18IN (5EA/BX)

## (undated) DEVICE — GLOVE SURGICAL PROTEXIS 8 1/2 - (50PR/BX)

## (undated) DEVICE — PAD LAP STERILE 18 X 18 - (5/PK 40PK/CA)